# Patient Record
Sex: FEMALE | Race: WHITE | NOT HISPANIC OR LATINO | Employment: PART TIME | URBAN - METROPOLITAN AREA
[De-identification: names, ages, dates, MRNs, and addresses within clinical notes are randomized per-mention and may not be internally consistent; named-entity substitution may affect disease eponyms.]

---

## 2018-07-10 PROBLEM — F51.04 CHRONIC INSOMNIA: Status: ACTIVE | Noted: 2018-07-10

## 2018-10-22 PROBLEM — D50.8 IRON DEFICIENCY ANEMIA SECONDARY TO INADEQUATE DIETARY IRON INTAKE: Status: ACTIVE | Noted: 2018-10-22

## 2021-05-06 ENCOUNTER — PATIENT MESSAGE (OUTPATIENT)
Dept: RESEARCH | Facility: HOSPITAL | Age: 51
End: 2021-05-06

## 2022-04-06 ENCOUNTER — TELEPHONE (OUTPATIENT)
Dept: SPINE | Facility: CLINIC | Age: 52
End: 2022-04-06
Payer: MEDICAID

## 2022-04-12 ENCOUNTER — TELEPHONE (OUTPATIENT)
Dept: SPINE | Facility: CLINIC | Age: 52
End: 2022-04-12
Payer: MEDICAID

## 2022-04-12 NOTE — TELEPHONE ENCOUNTER
Patient returned call.  No new Medicaid appointments available in [place].  Offered to check [facility] but patient declined.  Suggested that she call the Medicaid assistance number that I had left on her voice mail.

## 2022-04-12 NOTE — TELEPHONE ENCOUNTER
2nd attempt to contact patient.  LVM  No new medicaid appointments available.  Gave her the Medicaid assistance number

## 2022-08-29 ENCOUNTER — OFFICE VISIT (OUTPATIENT)
Dept: NEUROLOGY | Facility: CLINIC | Age: 52
End: 2022-08-29
Payer: MEDICAID

## 2022-08-29 VITALS
BODY MASS INDEX: 28.32 KG/M2 | WEIGHT: 153.88 LBS | RESPIRATION RATE: 17 BRPM | SYSTOLIC BLOOD PRESSURE: 109 MMHG | DIASTOLIC BLOOD PRESSURE: 75 MMHG | HEART RATE: 85 BPM | HEIGHT: 62 IN

## 2022-08-29 DIAGNOSIS — D50.8 IRON DEFICIENCY ANEMIA SECONDARY TO INADEQUATE DIETARY IRON INTAKE: ICD-10-CM

## 2022-08-29 DIAGNOSIS — G43.719 INTRACTABLE CHRONIC MIGRAINE WITHOUT AURA AND WITHOUT STATUS MIGRAINOSUS: Primary | ICD-10-CM

## 2022-08-29 DIAGNOSIS — G43.009 MIGRAINE WITHOUT AURA AND WITHOUT STATUS MIGRAINOSUS, NOT INTRACTABLE: ICD-10-CM

## 2022-08-29 DIAGNOSIS — E53.8 VITAMIN B12 DEFICIENCY: ICD-10-CM

## 2022-08-29 DIAGNOSIS — F51.04 CHRONIC INSOMNIA: ICD-10-CM

## 2022-08-29 DIAGNOSIS — Z98.84 S/P GASTRIC BYPASS: ICD-10-CM

## 2022-08-29 DIAGNOSIS — E55.9 VITAMIN D DEFICIENCY: ICD-10-CM

## 2022-08-29 DIAGNOSIS — R51.9 WORSENING HEADACHES: ICD-10-CM

## 2022-08-29 DIAGNOSIS — F90.2 ATTENTION DEFICIT HYPERACTIVITY DISORDER (ADHD), COMBINED TYPE: ICD-10-CM

## 2022-08-29 PROCEDURE — 99205 PR OFFICE/OUTPT VISIT, NEW, LEVL V, 60-74 MIN: ICD-10-PCS | Mod: S$PBB,,, | Performed by: PSYCHIATRY & NEUROLOGY

## 2022-08-29 PROCEDURE — 99215 OFFICE O/P EST HI 40 MIN: CPT | Mod: PBBFAC,PO | Performed by: PSYCHIATRY & NEUROLOGY

## 2022-08-29 PROCEDURE — 3074F PR MOST RECENT SYSTOLIC BLOOD PRESSURE < 130 MM HG: ICD-10-PCS | Mod: CPTII,,, | Performed by: PSYCHIATRY & NEUROLOGY

## 2022-08-29 PROCEDURE — 99999 PR PBB SHADOW E&M-EST. PATIENT-LVL V: ICD-10-PCS | Mod: PBBFAC,,, | Performed by: PSYCHIATRY & NEUROLOGY

## 2022-08-29 PROCEDURE — 3008F PR BODY MASS INDEX (BMI) DOCUMENTED: ICD-10-PCS | Mod: CPTII,,, | Performed by: PSYCHIATRY & NEUROLOGY

## 2022-08-29 PROCEDURE — 99205 OFFICE O/P NEW HI 60 MIN: CPT | Mod: S$PBB,,, | Performed by: PSYCHIATRY & NEUROLOGY

## 2022-08-29 PROCEDURE — 3008F BODY MASS INDEX DOCD: CPT | Mod: CPTII,,, | Performed by: PSYCHIATRY & NEUROLOGY

## 2022-08-29 PROCEDURE — 1160F PR REVIEW ALL MEDS BY PRESCRIBER/CLIN PHARMACIST DOCUMENTED: ICD-10-PCS | Mod: CPTII,,, | Performed by: PSYCHIATRY & NEUROLOGY

## 2022-08-29 PROCEDURE — 3074F SYST BP LT 130 MM HG: CPT | Mod: CPTII,,, | Performed by: PSYCHIATRY & NEUROLOGY

## 2022-08-29 PROCEDURE — 99999 PR PBB SHADOW E&M-EST. PATIENT-LVL V: CPT | Mod: PBBFAC,,, | Performed by: PSYCHIATRY & NEUROLOGY

## 2022-08-29 PROCEDURE — 1159F MED LIST DOCD IN RCRD: CPT | Mod: CPTII,,, | Performed by: PSYCHIATRY & NEUROLOGY

## 2022-08-29 PROCEDURE — 3078F DIAST BP <80 MM HG: CPT | Mod: CPTII,,, | Performed by: PSYCHIATRY & NEUROLOGY

## 2022-08-29 PROCEDURE — 3078F PR MOST RECENT DIASTOLIC BLOOD PRESSURE < 80 MM HG: ICD-10-PCS | Mod: CPTII,,, | Performed by: PSYCHIATRY & NEUROLOGY

## 2022-08-29 PROCEDURE — 1160F RVW MEDS BY RX/DR IN RCRD: CPT | Mod: CPTII,,, | Performed by: PSYCHIATRY & NEUROLOGY

## 2022-08-29 PROCEDURE — 1159F PR MEDICATION LIST DOCUMENTED IN MEDICAL RECORD: ICD-10-PCS | Mod: CPTII,,, | Performed by: PSYCHIATRY & NEUROLOGY

## 2022-08-29 RX ORDER — SUMATRIPTAN SUCCINATE 100 MG/1
100 TABLET ORAL
Qty: 12 TABLET | Refills: 5 | Status: SHIPPED | OUTPATIENT
Start: 2022-08-29 | End: 2022-10-25 | Stop reason: SDUPTHER

## 2022-08-29 RX ORDER — ONDANSETRON 4 MG/1
4 TABLET, ORALLY DISINTEGRATING ORAL EVERY 6 HOURS PRN
Qty: 15 TABLET | Refills: 6 | Status: SHIPPED | OUTPATIENT
Start: 2022-08-29 | End: 2022-10-25 | Stop reason: SDUPTHER

## 2022-08-29 NOTE — PROGRESS NOTES
[facility]    Chief complaint:headache   Referred by: Suha Sepulveda MD  [address] [facility]  [place] [address]     History of Present Illness:    52Y RH F with ADHD, adjustment disorder with anxiety, depression, TMJ/bruxism, vitamin D deficiency, vitamin b12 deficiency, s/p gastric bypass (Anthony-en-Y),  chronic back pain who is here for initial evaluation of headache.     Headache history  Age at onset and course over time: first migraine after she had her first child in 1996 she was 26 years old. She had gone to neurologist in the past and was told it was stress induced migraine. lifelong headache attacks, She has had headache every day for many years she had been with OTC medications. Then things worsened after she had covid since June 2022 and since then the migraine is now much worse and now having more facial pain and all head pain. She mentions that HA is her first symptom of COVID and has continued. She mentions that she has holocephalic headache now. She mentions that she lives with severe back pain, but this is very disruptive now.   Location: fronrtal/periorbital, but can be holocephacli   Character: throbbing/pounding, tight band, pressure   Intensity:  2-10/10, currently 2/10   Baseline pain level is 3-4/10 and escalations to severe pain 5 or more days a week.   Frequency: 30/30  No headache free times   Duration: constant baseline, escalations >4 hours to days   Aura: none   Associated symptoms: photophobia, phonophobia, osmophobia, kinesiophobia, neck tightness/pain, nausea  Other neurologic symptoms: dizziness, blurry vision, mood changes, difficulty concentarting, neck tightness, nasal congestion, sinus pressure   Precipitating factors: sleep deprivation, weather changes, stress  Alleviating factors: sleep, darkness,  massage, local pressure, medications  Aggravating factors: exposure to light, sound, if has attack already  "coughing/sneezing/bending over  Family history of headache: mother and sister had migraine.   ER/UC visits: 0  Caffeine: "plenty"  Sleep: insomnia- trouble initiating/maintaining sleep , uses trazodone nightly.   Gyn: hysterectomy, menopausal     Medication history:  Acute:  Excedrin - 6 days/week  Tizanidine 4mg - 3-5 days/week   Nurtec   APAP  Ibuprofen     Prevention:  Lexapro -currently on this   Trazodone nightly for insomnia   Sertraline - in the past.     Unable to do antihypertensive ssecondary to low blood pressuer at baseline  Unable to do Depakote due to weight and currently using ozempic for weight loss  Unable to do topiramate secondary to ADHD   Unable to use TCA due to trazodone use ofr sleep and weight.     MIDAS: 105      Neuroimaging and other studies:   Results for orders placed or performed during the hospital encounter of 07/16/21   CT Head Without Contrast    Narrative    EXAMINATION:  CT HEAD WITHOUT CONTRAST    CLINICAL HISTORY:  Altered mental status;    TECHNIQUE:  Low dose axial images were obtained through the head.  Coronal and sagittal reformations were also performed. Contrast was not administered.  Automated exposure control was utilized.   [id number]    COMPARISON:  None.    FINDINGS:  No focal lesions are identified.  There is no evidence of ventricular dilatation, midline shift, or hemorrhage.  No territorial infarct was visualized.  No extra cerebral fluid collection was demonstrated.  The bony calvarium appears intact.  No sinus or mastoid abnormality was noted.      Impression    Negative study.      Electronically signed by: Riaz Brooks MD  Date:    07/16/2021  Time:    15:49         ROS: The fourteen point review of system was performed.       Constitutional:  Denies fevers/cold or heat intolerance, weight loss/gain or fatigue.  Eyes:                        Denies diplopia, ptosis, visual field defects or ocular disease   excepting any listed above.  ENT:   Denies hearing " loss, infection, carcinoma or other diseases excepting any listed above.   Cardiovascular:  Denies stroke, CAD, arrhythmia, CHF or other disease excepting any listed above.  Pulmonary:  Denies dyspnea, COPD, RAD or infection or neoplasm excepting any listed above.  Gastrointestinal: Denies ulcer disease, liver or other disease excepting any listed above.  Skin:   Denies rash, skin cancer, or other cutaneous disorder excepting any listed above.  Neurological:  See HPI  Musculoskeletal: Denies RA, fractures or other joint or skeletal problems excepting any listed above.  Heme/Lymphatic: Denies any blood or lymph system neoplasm or disorder excepting any listed above.  Endocrine:  Denies any thyroid or other disorders, excepting any listed above.  Allergic/Immuno: Denies any autoimmune disease or allergy excepting any listed above.  Psychiatric:  Denies any disorder or treatment excepting any listed above.  Urologic:  Denies any difficulties with the urinary system or sexual function except noted above.    Past Medical History:   Diagnosis Date    Adjustment disorder with anxiety 08/06/2015    Anemia 08/06/2015    Attention deficit disorder with hyperactivity(314.01) 08/06/2015    Bronchitis with flu     Complete tear of right rotator cuff 12/27/2016    Depression     General anesthetics causing adverse effect in therapeutic use     Headache     Hypotension, iatrogenic     TMJ (dislocation of temporomandibular joint)        Past Surgical History:   Procedure Laterality Date    ADENOIDECTOMY      COLONOSCOPY      GASTRIC BYPASS      HYSTERECTOMY      ROTATOR CUFF REPAIR Right     SHOULDER ARTHROSCOPY Right 12/27/2016    RCR w/patch    TONSILLECTOMY           Family History   Problem Relation Age of Onset    Cancer Mother     Diabetes Father     Diabetes Sister     Arthritis Maternal Grandmother        Social history:  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, 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   Smoking, Alcohol, IVDU:  Social History     Tobacco Use    Smoking status: Never    Smokeless tobacco: Never   Substance Use Topics    Alcohol use: Yes     Alcohol/week: 0.0 standard drinks     Comment: ONCE EVERY 2 MONTHS    Drug use: No         Review of patient's allergies indicates:   Allergen Reactions    Demerol [meperidine] Itching    Niacin preparations Other (See Comments)     RED MAN SYNDRONE    Penicillins Swelling     OF THROAT    Sudafed [pseudoephedrine hcl] Palpitations    Wellbutrin [bupropion hcl] Photosensitivity, Nausea Only, Anxiety, Palpitations and Other (See Comments)     Leg numbness, sorrow, unexplained crying         Current Outpatient Medications:     benzonatate (TESSALON PERLES) 100 MG capsule, Take 1 capsule (100 mg total) by mouth 3 (three) times daily as needed for Cough., Disp: 30 capsule, Rfl: 1    CHOLECALCIFEROL, VITAMIN D3, (VITAMIN D3 ORAL), Take 1 tablet by mouth once daily. , Disp: , Rfl:     CYANOCOBALAMIN, VITAMIN B-12, ORAL, Take 1 tablet by mouth once daily. , Disp: , Rfl:     dextroamphetamine-amphetamine (ADDERALL XR) 30 MG 24 hr capsule, Take 1 capsule (30 mg total) by mouth every morning., Disp: 30 capsule, Rfl: 0    [START ON 9/9/2022] dextroamphetamine-amphetamine (ADDERALL XR) 30 MG 24 hr capsule, Take 1 capsule (30 mg total) by mouth every morning., Disp: 30 capsule, Rfl: 0    [START ON 10/9/2022] dextroamphetamine-amphetamine (ADDERALL XR) 30 MG 24 hr capsule, Take 1 capsule (30 mg total) by mouth every morning., Disp: 30 capsule, Rfl: 0    EScitalopram oxalate (LEXAPRO) 20 MG tablet, Take 1 tablet (20 mg total) by mouth once daily., Disp: 90 tablet, Rfl: 1    ferrous sulfate 325 mg (65 mg iron) Tab tablet, Take 325 mg by mouth daily with breakfast., Disp: , Rfl:     fluticasone propionate (FLONASE) 50 mcg/actuation nasal spray, 1 spray (50 mcg total) by Each Nostril route once daily., Disp: 16 g, Rfl: 2    hydrOXYzine HCL (ATARAX) 25 MG tablet, TAKE  1 TABLET(25 MG) BY MOUTH EVERY NIGHT AS NEEDED FOR INSOMNIA, Disp: 90 tablet, Rfl: 1    methylPREDNISolone (MEDROL DOSEPACK) 4 mg tablet, use as directed, Disp: 21 each, Rfl: 0    multivitamin (THERAGRAN) per tablet, Take 1 tablet by mouth once daily., Disp: , Rfl:     OZEMPIC 1 mg/dose (4 mg/3 mL), INJECT 1 MG UNDER THE SKIN ONCE WEEKLY AS DIRECTED, Disp: , Rfl:     promethazine (PHENERGAN) 6.25 mg/5 mL syrup, Take 5 mLs (6.25 mg total) by mouth every 6 (six) hours as needed (cough)., Disp: 118 mL, Rfl: 0    spironolactone (ALDACTONE) 50 MG tablet, Take 50 mg by mouth 2 (two) times daily., Disp: , Rfl:     tiZANidine (ZANAFLEX) 4 MG tablet, Take 1 tablet (4 mg total) by mouth nightly as needed (pain)., Disp: 30 tablet, Rfl: 1    traZODone (DESYREL) 150 MG tablet, TAKE 1 TABLET(150 MG) BY MOUTH EVERY NIGHT AS NEEDED FOR INSOMNIA, Disp: 90 tablet, Rfl: 1    UNABLE TO FIND, medication name: Hormone Replacement, Disp: , Rfl:       PHYSICAL EXAMINATION  Vitals:    08/29/22 1004   BP: 109/75   Pulse: 85   Resp: 17     General: The patient is a well-developed, well-nourished looking of stated age in no acute distress.  Head: Normocephalic, atraumatic  Eyes, ears, nose and throat: normal.  Neck: Supple  Cardiovascular: No carotid bruits.  Regular rate and rhythm.   +pericranial tenderness temporalis , occipitalis tenderness  Full ROM   NEUROLOGIC EXAM:  MENTAL: The patient is awake, alert and oriented times to time, place, location and situation. Intact recent memory, attention, concentration. Language and speech are normal. No aphasia, no dysarthria  CRANIAL NERVES:   CN II: visual fields are intact to confrontation with no defects;  funduscopic examination is within normal limits without evidence of papilledema and signs of venous pulsations.  Pupils are equal, round and reactive to light and accommodation.    III, IV and VI: extraocular movements are intact to all directions of gaze with normal convergence.  V: facial  sensation is intact to light touch in divisions V1 through V3.    VII: Facial muscle strength is intact to eyebrow raising, forceful eyelid closure, and cheek puffing.    VIII: Hearing acuity is intact to finger rub.  IX, X: palate rises symmetrically and uvula midline.    XI: Sternocleidomastoid and trapezius strength are 5/5 bilaterally.    XII: Tongue protrudes midline without atrophy or fasciculations.   MOTOR EXAM: No atrophy or fasciculations are present. No pronator drift,  shows normal strength ( 5/5 strength )in all 4 extremities. Tone is normal. SENSORY EXAM: intact pinprick, light touch, vibration, and position bilaterally.  CEREBELLAR EXAM: shows normal finger-to-nose and heel-to-shin.   DEEP TENDON REFLEXES:  +2 in brachioradialis, biceps, triceps, knee jerks, ankle jerks, downgoing toes b/l. No abnormal reflexes are present.  GAIT/STANCE: Romberg Negative.  Standard gait was normal with normal stride, arm swing and turning.  Normal heel and toe walking and tandem gait.     Impression:  Chronic migraine without aura - lifelong migraine headache, family history of headache, she has had years of daily migraine headaches, that she has been managing with OTC meds almost daily, but since COVID 19 infection the intensity of her attacks has worsened and the disability associated with attacks as increased. She is overusing excedrin 6 days a week or so. She has had a gastric bypass and reports having had history of gastric ulcer in the past.  Discussed chronic migraine, post covid headache, discussed MOH at length. She has co morbidities of TMJ/bruxism, anxiety/depression, insomnia. Will apply for Botox for chronic migraine prevention. She has had multiple antidepressants, has low BP at baseline thus unable to do antihypertensives and also is on Ozempic for weight loss thus will avoid TCA, depakote. She has ADHD will avoid Topiramate given her anxiety and ADHD. Will trial triptans for acute management.      Headache due to viral infection - significantly worse after COVID 19 infection since 6/22 increase in intensity and also severity and frequency of severe attacks since covid infection.     Medication overuse headache - from Excedrin       Comorbidities:  Vitamin b12 deficiency  - in the last year B12 went from 559 to 337 1 month ago, she has gastric bypass, has been having fatigue - needs replacement of Vitamin b12 if doing orally not effective recommend IM supplementation goal is above 500     S/p gastric bypass     Vitamin D deficiency     Iron deficiency anemia     ADHD- on medication    Anxiety/depression - on management     Chronic insomnia- uses trazodone PRN     Plan:   1- For preventive management: a. The patient has chronic migraines (G43.719). Patient suffers from headaches more than 15 days a month lasting more than 4 hours a day with no relief of symptoms despite trying multiple medications including but not limited to anti- epileptics,  antihypertensives,  and antidepressants. Botox treatment was approved for chronic migraines in October 2010. The patient will be an ideal candidate for Botox. We are planning for 3 treatments 3 months apart and aiming for atleast 50% improvement in the symptoms. If we see no improvement after 3 treatments, we will discontinue the injections.    Muscles to be injected:  total of 155 units of botulinum toxin type A were  injected in the following muscles: Procerus 5 units,  5 units bilaterally, frontalis 20 units, temporalis 20 units bilaterally, occipitalis 15 units, upper cervical paraspinals 10 units bilaterally and trapezius 15 units bilaterally. Total of 155 units in 31 sites. Unavoidable waste of 45 units will be discarded.         2- Acute management: Sumatriptan 100 mg at onset of attack, can repeat after 2 hours. Max 2/day. No more than 2-3 days a week or 10 days a month.     3- MRI brain w/wo contrast     4- Keep headache diary       RTC in 3 weeks  for Botox.         Rosalinda Tucker MD   Board Certified Neurologist  [facility] Certified Headache Medicine     I spent 60 minutes on day of this encounter preparing, treating, and managing this patient with chronic migraine, headache due to viral infection, adhd, anxiety/depression, chronic insomnia, vitamin b12 defiency , vitamin d deficiency , s/p gastric bypass.

## 2022-09-14 ENCOUNTER — HOSPITAL ENCOUNTER (OUTPATIENT)
Dept: RADIOLOGY | Facility: HOSPITAL | Age: 52
Discharge: HOME | End: 2022-09-14
Attending: PSYCHIATRY & NEUROLOGY
Payer: COMMERCIAL

## 2022-09-14 DIAGNOSIS — R51.9 WORSENING HEADACHES: ICD-10-CM

## 2022-09-14 PROCEDURE — 25500020 PHARM REV CODE 255: Mod: PO | Performed by: PSYCHIATRY & NEUROLOGY

## 2022-09-14 PROCEDURE — 70553 MRI BRAIN STEM W/O & W/DYE: CPT | Mod: TC,PO

## 2022-09-14 PROCEDURE — 70553 MRI BRAIN STEM W/O & W/DYE: CPT | Mod: 26,,, | Performed by: RADIOLOGY

## 2022-09-14 PROCEDURE — 70553 MRI BRAIN W WO CONTRAST: ICD-10-PCS | Mod: 26,,, | Performed by: RADIOLOGY

## 2022-09-14 PROCEDURE — A9585 GADOBUTROL INJECTION: HCPCS | Mod: PO | Performed by: PSYCHIATRY & NEUROLOGY

## 2022-09-14 RX ORDER — GADOBUTROL 604.72 MG/ML
6 INJECTION INTRAVENOUS
Status: COMPLETED | OUTPATIENT
Start: 2022-09-14 | End: 2022-09-14

## 2022-09-14 RX ADMIN — GADOBUTROL 6 ML: 604.72 INJECTION INTRAVENOUS at 09:09

## 2022-09-19 ENCOUNTER — PROCEDURE VISIT (OUTPATIENT)
Dept: NEUROLOGY | Facility: CLINIC | Age: 52
End: 2022-09-19
Payer: MEDICAID

## 2022-09-19 VITALS
DIASTOLIC BLOOD PRESSURE: 76 MMHG | HEART RATE: 93 BPM | WEIGHT: 153 LBS | RESPIRATION RATE: 17 BRPM | BODY MASS INDEX: 28.16 KG/M2 | SYSTOLIC BLOOD PRESSURE: 119 MMHG | HEIGHT: 62 IN

## 2022-09-19 DIAGNOSIS — G43.719 INTRACTABLE CHRONIC MIGRAINE WITHOUT AURA AND WITHOUT STATUS MIGRAINOSUS: Primary | ICD-10-CM

## 2022-09-19 PROCEDURE — 64615 PR CHEMODENERVATION OF MUSCLE FOR CHRONIC MIGRAINE: ICD-10-PCS | Mod: S$PBB,,, | Performed by: PSYCHIATRY & NEUROLOGY

## 2022-09-19 PROCEDURE — 64615 CHEMODENERV MUSC MIGRAINE: CPT | Mod: S$PBB,,, | Performed by: PSYCHIATRY & NEUROLOGY

## 2022-09-19 PROCEDURE — 64615 CHEMODENERV MUSC MIGRAINE: CPT | Mod: PBBFAC,PO | Performed by: PSYCHIATRY & NEUROLOGY

## 2022-09-19 RX ADMIN — ONABOTULINUMTOXINA 200 UNITS: 100 INJECTION, POWDER, LYOPHILIZED, FOR SOLUTION INTRADERMAL; INTRAMUSCULAR at 09:09

## 2022-09-19 NOTE — PROCEDURES
Procedures    BOTOX PROCEDURE    PROCEDURE PERFORMED: Botulinum toxin injection (80338)    CLINICAL INDICATION: G43.719    Cycle #1    A time out was conducted just before the start of the procedure to verify the correct patient and procedure, procedure location, and all relevant critical information.   Signed consent obtained prior to procedure     Conventional methods of treatment but the patient has been unresponsive and refractory.The patient meets criteria for chronic headaches according to the ICHD-III, the patient has more than 15 headaches a month at least 8 of them meet migraine criteria, which last for more than 4 hours a day.     This is the first Botox injections and I am aiming for at least 50%  improvement in the patient's symptoms. Frequency of treatment is every 3 months unless no response to the treatments, at which time we will discontinue the injections.     DESCRIPTION OF PROCEDURE: After obtaining informed consent and under   aseptic technique, a total of 155 units of botulinum toxin type A were   injected in the following muscles: Procerus 5 units,  5 units   bilaterally, frontalis 20 units, temporalis 20 units bilaterally,   occipitalis 15 units, upper cervical paraspinals 10 units bilaterally and trapezius 15 units bilaterally. The patient was given a total of 155 units in 31 sites.      The patient tolerated the procedure well. There were no complications. The patient was given a prescription for repeat treatment in 3 months.     Unavoidable waste 45 units    Rosalinda Tucker MD   Board Certified Neurologist   [facility] Certified Headache Medicine

## 2022-10-25 ENCOUNTER — OFFICE VISIT (OUTPATIENT)
Dept: NEUROLOGY | Facility: CLINIC | Age: 52
End: 2022-10-25
Payer: MEDICAID

## 2022-10-25 DIAGNOSIS — G43.719 INTRACTABLE CHRONIC MIGRAINE WITHOUT AURA AND WITHOUT STATUS MIGRAINOSUS: Primary | ICD-10-CM

## 2022-10-25 PROCEDURE — 1160F RVW MEDS BY RX/DR IN RCRD: CPT | Mod: CPTII,95,, | Performed by: PSYCHIATRY & NEUROLOGY

## 2022-10-25 PROCEDURE — 99213 OFFICE O/P EST LOW 20 MIN: CPT | Mod: 95,,, | Performed by: PSYCHIATRY & NEUROLOGY

## 2022-10-25 PROCEDURE — 1159F PR MEDICATION LIST DOCUMENTED IN MEDICAL RECORD: ICD-10-PCS | Mod: CPTII,95,, | Performed by: PSYCHIATRY & NEUROLOGY

## 2022-10-25 PROCEDURE — 99213 PR OFFICE/OUTPT VISIT, EST, LEVL III, 20-29 MIN: ICD-10-PCS | Mod: 95,,, | Performed by: PSYCHIATRY & NEUROLOGY

## 2022-10-25 PROCEDURE — 1160F PR REVIEW ALL MEDS BY PRESCRIBER/CLIN PHARMACIST DOCUMENTED: ICD-10-PCS | Mod: CPTII,95,, | Performed by: PSYCHIATRY & NEUROLOGY

## 2022-10-25 PROCEDURE — 1159F MED LIST DOCD IN RCRD: CPT | Mod: CPTII,95,, | Performed by: PSYCHIATRY & NEUROLOGY

## 2022-10-25 RX ORDER — SUMATRIPTAN SUCCINATE 100 MG/1
100 TABLET ORAL
Qty: 12 TABLET | Refills: 5 | Status: SHIPPED | OUTPATIENT
Start: 2022-10-25 | End: 2023-05-16 | Stop reason: ALTCHOICE

## 2022-10-25 RX ORDER — ONDANSETRON 4 MG/1
4 TABLET, ORALLY DISINTEGRATING ORAL EVERY 6 HOURS PRN
Qty: 15 TABLET | Refills: 6 | Status: SHIPPED | OUTPATIENT
Start: 2022-10-25 | End: 2024-01-22 | Stop reason: SDUPTHER

## 2022-10-25 NOTE — PROGRESS NOTES
[facility]- Headache [facility]  The patient location is: [place]   The chief complaint leading to consultation is: chronic migraine     Visit type: audiovisual    Face to Face time with patient: 10 minutes  25 minutes of total time spent on the encounter, which includes face to face time and non-face to face time preparing to see the patient (eg, review of tests), Obtaining and/or reviewing separately obtained history, Documenting clinical information in the electronic or other health record, Independently interpreting results (not separately reported) and communicating results to the patient/family/caregiver, or Care coordination (not separately reported).         Each patient to whom he or she provides medical services by telemedicine is:  (1) informed of the relationship between the physician and patient and the respective role of any other health care provider with respect to management of the patient; and (2) notified that he or she may decline to receive medical services by telemedicine and may withdraw from such care at any time.    Notes:     Chief complaint: chronic migraine     History of Present Illness:    52Y RH F with ADHD, adjustment disorder with anxiety, depression, TMJ/bruxism, vitamin D deficiency, vitamin b12 deficiency, s/p gastric bypass (Anthony-en-Y),  chronic back pain who is here for further evaluation of chronic migraine. She was initially seen on 8/22/22 at which time had constant baseline moderate pain level with severe escalations 5 days or more and no headache free times. She was started on Botox for chronic migraine which was started on 9/19/22. She mentions that she feels that her migraine have been less frequent and not as intense and severe as before. She noted improvement a few weeks later she noted improvement in her migraine. She mentions that her baseline pain has dropped and the number severe escalations has improved. She has now only 1 severe escalation if that  "now. She has used sumatriptan 100mg a few times, but she has found that zofran PRN helped the nausea. She mentions that when it gets the point that she has to take the sumatriptan she shuts down and goes to sleep and when wakes up from rest she will feel better.     Headache history  Age at onset and course over time: first migraine after she had her first child in 1996 she was 26 years old. She had gone to neurologist in the past and was told it was stress induced migraine. lifelong headache attacks, She has had headache every day for many years she had been with OTC medications. Then things worsened after she had covid since June 2022 and since then the migraine is now much worse and now having more facial pain and all head pain. She mentions that HA is her first symptom of COVID and has continued. She mentions that she has holocephalic headache now. She mentions that she lives with severe back pain, but this is very disruptive now.   Location: fronrtal/periorbital, but can be holocephacli   Character: throbbing/pounding, tight band, pressure   Intensity:  2-10/10, currently 2/10   Baseline pain level is 3-4/10 and escalations to severe pain 5 or more days a week.   Frequency: 30/30  No headache free times   Duration: constant baseline, escalations >4 hours to days   Aura: none   Associated symptoms: photophobia, phonophobia, osmophobia, kinesiophobia, neck tightness/pain, nausea  Other neurologic symptoms: dizziness, blurry vision, mood changes, difficulty concentarting, neck tightness, nasal congestion, sinus pressure   Precipitating factors: sleep deprivation, weather changes, stress  Alleviating factors: sleep, darkness,  massage, local pressure, medications  Aggravating factors: exposure to light, sound, if has attack already coughing/sneezing/bending over  Family history of headache: mother and sister had migraine.   ER/UC visits: 0  Caffeine: "plenty"  Sleep: insomnia- trouble initiating/maintaining sleep , " uses trazodone nightly.   Gyn: hysterectomy, menopausal     Medication history:  Acute:  Excedrin - 6 days/week  Tizanidine 4mg - 3-5 days/week   Nurtec   APAP  Ibuprofen     Prevention:  Lexapro -currently on this   Trazodone nightly for insomnia   Sertraline - in the past.     Unable to do antihypertensive ssecondary to low blood pressuer at baseline  Unable to do Depakote due to weight and currently using ozempic for weight loss  Unable to do topiramate secondary to ADHD   Unable to use TCA due to trazodone use ofr sleep and weight.     MIDAS: 105      Neuroimaging and other studies:   Results for orders placed or performed during the hospital encounter of 07/16/21   CT Head Without Contrast    Narrative    EXAMINATION:  CT HEAD WITHOUT CONTRAST    CLINICAL HISTORY:  Altered mental status;    TECHNIQUE:  Low dose axial images were obtained through the head.  Coronal and sagittal reformations were also performed. Contrast was not administered.  Automated exposure control was utilized.   [id number]    COMPARISON:  None.    FINDINGS:  No focal lesions are identified.  There is no evidence of ventricular dilatation, midline shift, or hemorrhage.  No territorial infarct was visualized.  No extra cerebral fluid collection was demonstrated.  The bony calvarium appears intact.  No sinus or mastoid abnormality was noted.      Impression    Negative study.      Electronically signed by: Riaz Brooks MD  Date:    07/16/2021  Time:    15:49         ROS: The fourteen point review of system was performed.       Constitutional:  Denies fevers/cold or heat intolerance, weight loss/gain or fatigue.  Eyes:                        Denies diplopia, ptosis, visual field defects or ocular disease   excepting any listed above.  ENT:   Denies hearing loss, infection, carcinoma or other diseases excepting any listed above.   Cardiovascular:  Denies stroke, CAD, arrhythmia, CHF or other disease excepting any listed  above.  Pulmonary:  Denies dyspnea, COPD, RAD or infection or neoplasm excepting any listed above.  Gastrointestinal: Denies ulcer disease, liver or other disease excepting any listed above.  Skin:   Denies rash, skin cancer, or other cutaneous disorder excepting any listed above.  Neurological:  See HPI  Musculoskeletal: Denies RA, fractures or other joint or skeletal problems excepting any listed above.  Heme/Lymphatic: Denies any blood or lymph system neoplasm or disorder excepting any listed above.  Endocrine:  Denies any thyroid or other disorders, excepting any listed above.  Allergic/Immuno: Denies any autoimmune disease or allergy excepting any listed above.  Psychiatric:  Denies any disorder or treatment excepting any listed above.  Urologic:  Denies any difficulties with the urinary system or sexual function except noted above.    Past Medical History:   Diagnosis Date    Adjustment disorder with anxiety 08/06/2015    Anemia 08/06/2015    Attention deficit disorder with hyperactivity(314.01) 08/06/2015    Bronchitis with flu     Complete tear of right rotator cuff 12/27/2016    Depression     General anesthetics causing adverse effect in therapeutic use     Headache     Hypotension, iatrogenic     TMJ (dislocation of temporomandibular joint)        Past Surgical History:   Procedure Laterality Date    ADENOIDECTOMY      COLONOSCOPY      GASTRIC BYPASS      HYSTERECTOMY      ROTATOR CUFF REPAIR Right     SHOULDER ARTHROSCOPY Right 12/27/2016    RCR w/patch    TONSILLECTOMY           Family History   Problem Relation Age of Onset    Cancer Mother     Diabetes Father     Diabetes Sister     Arthritis Maternal Grandmother        Social history:  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, 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   Smoking, Alcohol, IVDU:  Social History     Tobacco Use    Smoking status: Never    Smokeless tobacco: Never   Substance Use Topics    Alcohol use: Yes      Alcohol/week: 0.0 standard drinks     Comment: ONCE EVERY 2 MONTHS    Drug use: No         Review of patient's allergies indicates:   Allergen Reactions    Demerol [meperidine] Itching    Niacin preparations Other (See Comments)     RED MAN SYNDRONE    Penicillins Swelling     OF THROAT    Sudafed [pseudoephedrine hcl] Palpitations    Wellbutrin [bupropion hcl] Photosensitivity, Nausea Only, Anxiety, Palpitations and Other (See Comments)     Leg numbness, sorrow, unexplained crying         Current Outpatient Medications:     benzonatate (TESSALON PERLES) 100 MG capsule, Take 1 capsule (100 mg total) by mouth 3 (three) times daily as needed for Cough., Disp: 30 capsule, Rfl: 1    CHOLECALCIFEROL, VITAMIN D3, (VITAMIN D3 ORAL), Take 1 tablet by mouth once daily. , Disp: , Rfl:     CYANOCOBALAMIN, VITAMIN B-12, ORAL, Take 1 tablet by mouth once daily. , Disp: , Rfl:     dextroamphetamine-amphetamine (ADDERALL XR) 30 MG 24 hr capsule, Take 1 capsule (30 mg total) by mouth every morning., Disp: 30 capsule, Rfl: 0    dextroamphetamine-amphetamine (ADDERALL XR) 30 MG 24 hr capsule, Take 1 capsule (30 mg total) by mouth every morning., Disp: 30 capsule, Rfl: 0    dextroamphetamine-amphetamine (ADDERALL XR) 30 MG 24 hr capsule, Take 1 capsule (30 mg total) by mouth every morning., Disp: 30 capsule, Rfl: 0    diclofenac sodium (VOLTAREN) 1 % Gel, Apply 2 g topically 4 (four) times daily., Disp: 150 g, Rfl: 2    EScitalopram oxalate (LEXAPRO) 20 MG tablet, Take 1 tablet (20 mg total) by mouth once daily., Disp: 90 tablet, Rfl: 1    ferrous sulfate 325 mg (65 mg iron) Tab tablet, Take 325 mg by mouth daily with breakfast., Disp: , Rfl:     fluticasone propionate (FLONASE) 50 mcg/actuation nasal spray, 1 spray (50 mcg total) by Each Nostril route once daily., Disp: 16 g, Rfl: 2    hydrOXYzine HCL (ATARAX) 25 MG tablet, TAKE 1 TABLET(25 MG) BY MOUTH EVERY NIGHT AS NEEDED FOR INSOMNIA, Disp: 90 tablet, Rfl: 1    multivitamin  (THERAGRAN) per tablet, Take 1 tablet by mouth once daily., Disp: , Rfl:     ondansetron (ZOFRAN-ODT) 4 MG TbDL, Take 1 tablet (4 mg total) by mouth every 6 (six) hours as needed (nausea)., Disp: 15 tablet, Rfl: 6    OZEMPIC 1 mg/dose (4 mg/3 mL), INJECT 1 MG UNDER THE SKIN ONCE WEEKLY AS DIRECTED, Disp: , Rfl:     promethazine (PHENERGAN) 6.25 mg/5 mL syrup, Take 5 mLs (6.25 mg total) by mouth every 6 (six) hours as needed (cough)., Disp: 118 mL, Rfl: 0    spironolactone (ALDACTONE) 50 MG tablet, Take 50 mg by mouth 2 (two) times daily., Disp: , Rfl:     sumatriptan (IMITREX) 100 MG tablet, Take 1 tablet (100 mg total) by mouth as needed for Migraine (can repeat after 2 hours. max is 2/day.)., Disp: 12 tablet, Rfl: 5    tiZANidine (ZANAFLEX) 4 MG tablet, Take 1 tablet (4 mg total) by mouth nightly as needed (pain)., Disp: 30 tablet, Rfl: 1    traZODone (DESYREL) 150 MG tablet, TAKE 1 TABLET(150 MG) BY MOUTH EVERY NIGHT AS NEEDED FOR INSOMNIA, Disp: 90 tablet, Rfl: 1    UNABLE TO FIND, medication name: Hormone Replacement, Disp: , Rfl:       PHYSICAL EXAMINATION  There were no vitals filed for this visit.    General: The patient is a well-developed, well-nourished looking of stated age in no acute distress.  Head: Normocephalic, atraumatic  Eyes, ears, nose and throat: normal.  Neck: Supple  Cardiovascular: No carotid bruits.  Regular rate and rhythm.   +pericranial tenderness temporalis , occipitalis tenderness  Full ROM   NEUROLOGIC EXAM:  MENTAL: The patient is awake, alert and oriented times to time, place, location and situation. Intact recent memory, attention, concentration. Language and speech are normal. No aphasia, no dysarthria  CRANIAL NERVES:   CN II: visual fields are intact to confrontation with no defects;  funduscopic examination is within normal limits without evidence of papilledema and signs of venous pulsations.  Pupils are equal, round and reactive to light and accommodation.    III, IV and VI:  extraocular movements are intact to all directions of gaze with normal convergence.  V: facial sensation is intact to light touch in divisions V1 through V3.    VII: Facial muscle strength is intact to eyebrow raising, forceful eyelid closure, and cheek puffing.    VIII: Hearing acuity is intact to finger rub.  IX, X: palate rises symmetrically and uvula midline.    XI: Sternocleidomastoid and trapezius strength are 5/5 bilaterally.    XII: Tongue protrudes midline without atrophy or fasciculations.   MOTOR EXAM: No atrophy or fasciculations are present. No pronator drift,  shows normal strength ( 5/5 strength )in all 4 extremities. Tone is normal. SENSORY EXAM: intact pinprick, light touch, vibration, and position bilaterally.  CEREBELLAR EXAM: shows normal finger-to-nose and heel-to-shin.   DEEP TENDON REFLEXES:  +2 in brachioradialis, biceps, triceps, knee jerks, ankle jerks, downgoing toes b/l. No abnormal reflexes are present.  GAIT/STANCE: Romberg Negative.  Standard gait was normal with normal stride, arm swing and turning.  Normal heel and toe walking and tandem gait.     Impression:  Chronic migraine without aura - lifelong migraine headache, family history of headache, she has had years of daily migraine headaches, that she has been managing with OTC meds almost daily, but since COVID 19 infection the intensity of her attacks has worsened and the disability associated with attacks as increased. She had been initially overusing excedrin 6 days a week or so. She has had a gastric bypass and reports having had history of gastric ulcer in the past.  She has been started on Botox for chronic migraine management and after first cycle has noted improvement with decrease in the baseline pain and also the number of severe escalations, severe escalation at most 1 per week and baseline pain is mild and functional. We willl continue current regimen.     Headache due to viral infection - significantly worse after COVID  19 infection since 6/22 increase in intensity and also severity and frequency of severe attacks since covid infection.     Comorbidities:  Vitamin b12 deficiency  - in the last year B12 went from 559 to 337 1 month ago, she has gastric bypass, has been having fatigue - IM supplementation per PCP.     S/p gastric bypass     Vitamin D deficiency     Iron deficiency anemia     ADHD- on medication    Anxiety/depression - on management     Chronic insomnia- uses trazodone PRN     Plan:   1- For preventive management: a. Continue Botox every 12 weeks.          2- Acute management: Sumatriptan 100 mg at onset of attack, can repeat after 2 hours. Max 2/day. No more than 2-3 days a week or 10 days a month.   If nauseated add Zofran ODT 4mg ever 6-8 hours as needed.     3- Keep track of headache     RTC in 12/22 with NP CAYLA Page . Care will be transferred to my colleagues in headache clinic upon my departure from [facility]. Patient expressed understanding.           Rosalinda Tucker MD   Board Certified Neurologist  [facility] Certified Headache Medicine

## 2022-12-13 ENCOUNTER — PROCEDURE VISIT (OUTPATIENT)
Dept: NEUROLOGY | Facility: CLINIC | Age: 52
End: 2022-12-13
Payer: MEDICAID

## 2022-12-13 VITALS
WEIGHT: 138 LBS | SYSTOLIC BLOOD PRESSURE: 107 MMHG | DIASTOLIC BLOOD PRESSURE: 70 MMHG | HEIGHT: 62 IN | RESPIRATION RATE: 17 BRPM | HEART RATE: 83 BPM | BODY MASS INDEX: 25.4 KG/M2

## 2022-12-13 DIAGNOSIS — G43.719 INTRACTABLE CHRONIC MIGRAINE WITHOUT AURA AND WITHOUT STATUS MIGRAINOSUS: Primary | ICD-10-CM

## 2022-12-13 PROCEDURE — 64615 CHEMODENERV MUSC MIGRAINE: CPT | Mod: S$PBB,,, | Performed by: NURSE PRACTITIONER

## 2022-12-13 PROCEDURE — 64615 PR CHEMODENERVATION OF MUSCLE FOR CHRONIC MIGRAINE: ICD-10-PCS | Mod: S$PBB,,, | Performed by: NURSE PRACTITIONER

## 2022-12-13 PROCEDURE — 64615 CHEMODENERV MUSC MIGRAINE: CPT | Mod: PBBFAC,PO | Performed by: NURSE PRACTITIONER

## 2022-12-13 RX ADMIN — ONABOTULINUMTOXINA 200 UNITS: 100 INJECTION, POWDER, LYOPHILIZED, FOR SOLUTION INTRADERMAL; INTRAMUSCULAR at 09:12

## 2022-12-13 NOTE — PROCEDURES
Procedures    A time out was conducted just before the start of the procedure to verify the correct patient and procedure, procedure location, and all relevant critical information.      Conventional methods of treatment such as multiple medications, both on and   off label have been tried including: Lexapro, trazodone, sertraline, Unable to do antihypertensive ssecondary to low blood pressuer at baseline. Unable to do Depakote due to weight and currently using ozempic for weight loss  Unable to do topiramate secondary to ADHD   Unable to use TCA due to trazodone use ofr sleep and weight.      The patient has been unresponsive and refractory.The patient meets criteria for chronic headaches according to the ICHD-II, the patient has more than 15 headaches a month which last for more than 4 hours a day.     Botox session number: 2  Last session was 12 weeks ago and resulted in improvement of: n/a     I am aiming for at least 50%  improvement in the patient's symptoms. Frequency of treatment is every 3 months unless no response to the treatments, at which time we will discontinue the injections.      DESCRIPTION OF PROCEDURE: After obtaining informed consent and under   aseptic technique, a total of 155 units of botulinum toxin type A were   injected in the following muscles:      -- Procerus 5 units  --  5 units bilaterally  -- Frontalis 20 units  -- Temporalis 20 units bilaterally  -- Occipitalis 15 units bilaterally  -- Upper cervical paraspinals 10 units bilaterally  -- Trapezius 15 units bilaterally.      The patient tolerated the procedure well. There were no complications. The patient was given a prescription for repeat treatment in 12 weeks      Unavoidable waste 45 units    GUANAKITO Barfield

## 2023-02-14 ENCOUNTER — HOSPITAL ENCOUNTER (OUTPATIENT)
Dept: RADIOLOGY | Facility: HOSPITAL | Age: 53
Discharge: HOME | End: 2023-02-14
Attending: PHYSICIAN ASSISTANT
Payer: MEDICAID

## 2023-02-14 ENCOUNTER — OFFICE VISIT (OUTPATIENT)
Dept: PAIN MEDICINE | Facility: CLINIC | Age: 53
End: 2023-02-14
Payer: MEDICAID

## 2023-02-14 VITALS
HEIGHT: 62 IN | DIASTOLIC BLOOD PRESSURE: 63 MMHG | BODY MASS INDEX: 25.84 KG/M2 | WEIGHT: 140.44 LBS | SYSTOLIC BLOOD PRESSURE: 97 MMHG | HEART RATE: 87 BPM

## 2023-02-14 DIAGNOSIS — M54.2 NECK PAIN: ICD-10-CM

## 2023-02-14 DIAGNOSIS — G89.29 CHRONIC BILATERAL LOW BACK PAIN WITH RIGHT-SIDED SCIATICA: Primary | ICD-10-CM

## 2023-02-14 DIAGNOSIS — G89.29 CHRONIC BILATERAL LOW BACK PAIN WITH RIGHT-SIDED SCIATICA: ICD-10-CM

## 2023-02-14 DIAGNOSIS — M54.41 CHRONIC BILATERAL LOW BACK PAIN WITH RIGHT-SIDED SCIATICA: ICD-10-CM

## 2023-02-14 DIAGNOSIS — M54.41 CHRONIC BILATERAL LOW BACK PAIN WITH RIGHT-SIDED SCIATICA: Primary | ICD-10-CM

## 2023-02-14 PROCEDURE — 99203 PR OFFICE/OUTPT VISIT, NEW, LEVL III, 30-44 MIN: ICD-10-PCS | Mod: S$PBB,,, | Performed by: PHYSICIAN ASSISTANT

## 2023-02-14 PROCEDURE — 99999 PR PBB SHADOW E&M-EST. PATIENT-LVL V: ICD-10-PCS | Mod: PBBFAC,,, | Performed by: PHYSICIAN ASSISTANT

## 2023-02-14 PROCEDURE — 1159F PR MEDICATION LIST DOCUMENTED IN MEDICAL RECORD: ICD-10-PCS | Mod: CPTII,,, | Performed by: PHYSICIAN ASSISTANT

## 2023-02-14 PROCEDURE — 1160F RVW MEDS BY RX/DR IN RCRD: CPT | Mod: CPTII,,, | Performed by: PHYSICIAN ASSISTANT

## 2023-02-14 PROCEDURE — 3078F PR MOST RECENT DIASTOLIC BLOOD PRESSURE < 80 MM HG: ICD-10-PCS | Mod: CPTII,,, | Performed by: PHYSICIAN ASSISTANT

## 2023-02-14 PROCEDURE — 3074F PR MOST RECENT SYSTOLIC BLOOD PRESSURE < 130 MM HG: ICD-10-PCS | Mod: CPTII,,, | Performed by: PHYSICIAN ASSISTANT

## 2023-02-14 PROCEDURE — 3078F DIAST BP <80 MM HG: CPT | Mod: CPTII,,, | Performed by: PHYSICIAN ASSISTANT

## 2023-02-14 PROCEDURE — 72114 X-RAY EXAM L-S SPINE BENDING: CPT | Mod: TC,PO

## 2023-02-14 PROCEDURE — 72114 XR LUMBAR SPINE 5 VIEW WITH FLEX AND EXT: ICD-10-PCS | Mod: 26,,, | Performed by: RADIOLOGY

## 2023-02-14 PROCEDURE — 3074F SYST BP LT 130 MM HG: CPT | Mod: CPTII,,, | Performed by: PHYSICIAN ASSISTANT

## 2023-02-14 PROCEDURE — 1160F PR REVIEW ALL MEDS BY PRESCRIBER/CLIN PHARMACIST DOCUMENTED: ICD-10-PCS | Mod: CPTII,,, | Performed by: PHYSICIAN ASSISTANT

## 2023-02-14 PROCEDURE — 72052 X-RAY EXAM NECK SPINE 6/>VWS: CPT | Mod: TC,PO

## 2023-02-14 PROCEDURE — 3008F PR BODY MASS INDEX (BMI) DOCUMENTED: ICD-10-PCS | Mod: CPTII,,, | Performed by: PHYSICIAN ASSISTANT

## 2023-02-14 PROCEDURE — 72114 X-RAY EXAM L-S SPINE BENDING: CPT | Mod: 26,,, | Performed by: RADIOLOGY

## 2023-02-14 PROCEDURE — 99999 PR PBB SHADOW E&M-EST. PATIENT-LVL V: CPT | Mod: PBBFAC,,, | Performed by: PHYSICIAN ASSISTANT

## 2023-02-14 PROCEDURE — 72052 XR CERVICAL SPINE 5 VIEW WITH FLEX AND EXT: ICD-10-PCS | Mod: 26,,, | Performed by: RADIOLOGY

## 2023-02-14 PROCEDURE — 1159F MED LIST DOCD IN RCRD: CPT | Mod: CPTII,,, | Performed by: PHYSICIAN ASSISTANT

## 2023-02-14 PROCEDURE — 72052 X-RAY EXAM NECK SPINE 6/>VWS: CPT | Mod: 26,,, | Performed by: RADIOLOGY

## 2023-02-14 PROCEDURE — 3008F BODY MASS INDEX DOCD: CPT | Mod: CPTII,,, | Performed by: PHYSICIAN ASSISTANT

## 2023-02-14 PROCEDURE — 99215 OFFICE O/P EST HI 40 MIN: CPT | Mod: PBBFAC,PN | Performed by: PHYSICIAN ASSISTANT

## 2023-02-14 PROCEDURE — 99203 OFFICE O/P NEW LOW 30 MIN: CPT | Mod: S$PBB,,, | Performed by: PHYSICIAN ASSISTANT

## 2023-02-14 NOTE — PROGRESS NOTES
[facility] New Patient Evaluation      Referred by: Dr. Suha Sepulveda    PCP:   Suha Sepulveda MD    CC:   Chief Complaint   Patient presents with    Low-back Pain     Pain radiates down the right leg to the calf.    Neck Pain      No flowsheet data found.      HPI:   Sola Cross is a 52 y.o. female with history of anxiety, depression, migraine, adjustment disorder, chronic pain presents with neck and lower back pain.  She has chronic intermittent pain in the posterior neck and upper thoracic region.  She has years chronic intermittent lowr back pain.  In 2021 Lower back pain became more constant.  Pain is felt in the lower lumbar region with radiation to the right buttock, lateral thigh, anterior knee, lateral calf.  She is not sure if she has experienced any numbness or tingling in the leg.  Rare left leg pain.  She has tried voltaren gel, naproxen, zanaflex, and pain medication prescribed for a family member.  She has tried chiropractic care.      Past and current medications:  Antineuropathics:  NSAIDs:  (tried voltaren gel and naproxen)  Antidepressants:  Muscle relaxers:  (tried zanaflex)  Opioids: tried medication from a family member  Antiplatelets/Anticoagulants:    Physical therapy/ Chiropractic care:  Chiropractic care intermittently through the years with last visit in 2022.    Pain Intervention History:   none    Past Spine Surgical History:  none      History:    Current Outpatient Medications:     CHOLECALCIFEROL, VITAMIN D3, (VITAMIN D3 ORAL), Take 1 tablet by mouth once daily. , Disp: , Rfl:     CYANOCOBALAMIN, VITAMIN B-12, ORAL, Take 1 tablet by mouth once daily. , Disp: , Rfl:     dextroamphetamine-amphetamine (ADDERALL XR) 30 MG 24 hr capsule, Take 1 capsule (30 mg total) by mouth every morning., Disp: 30 capsule, Rfl: 0    dextroamphetamine-amphetamine (ADDERALL XR) 30 MG 24 hr capsule, Take 1 capsule (30 mg total) by mouth every morning., Disp: 30 capsule, Rfl: 0     dextroamphetamine-amphetamine (ADDERALL XR) 30 MG 24 hr capsule, Take 1 capsule (30 mg total) by mouth every morning., Disp: 30 capsule, Rfl: 0    dextroamphetamine-amphetamine (ADDERALL) 30 mg Tab, One tablet Daily after lunch., Disp: 30 tablet, Rfl: 0    dextroamphetamine-amphetamine (ADDERALL) 30 mg Tab, One table Daily after lunch., Disp: 30 tablet, Rfl: 0    [START ON 2/26/2023] dextroamphetamine-amphetamine (ADDERALL) 30 mg Tab, One table Daily after lunch., Disp: 30 tablet, Rfl: 0    diclofenac sodium (VOLTAREN) 1 % Gel, Apply 2 g topically 4 (four) times daily., Disp: 150 g, Rfl: 2    EScitalopram oxalate (LEXAPRO) 20 MG tablet, Take 1 tablet (20 mg total) by mouth once daily., Disp: 90 tablet, Rfl: 1    ferrous sulfate 325 mg (65 mg iron) Tab tablet, Take 325 mg by mouth daily with breakfast., Disp: , Rfl:     fluticasone propionate (FLONASE) 50 mcg/actuation nasal spray, 1 spray (50 mcg total) by Each Nostril route once daily., Disp: 16 g, Rfl: 2    HYDROcodone-acetaminophen (NORCO) 5-325 mg per tablet, Take 1 tablet by mouth every 6 (six) hours as needed for Pain., Disp: 10 tablet, Rfl: 0    hydrOXYzine HCL (ATARAX) 25 MG tablet, TAKE 1 TABLET(25 MG) BY MOUTH EVERY NIGHT AS NEEDED FOR INSOMNIA, Disp: 90 tablet, Rfl: 1    multivitamin (THERAGRAN) per tablet, Take 1 tablet by mouth once daily., Disp: , Rfl:     naproxen sodium (ANAPROX) 550 MG tablet, Take 1 tablet (550 mg total) by mouth 2 (two) times daily with meals., Disp: 20 tablet, Rfl: 0    ondansetron (ZOFRAN-ODT) 4 MG TbDL, Take 1 tablet (4 mg total) by mouth every 6 (six) hours as needed (nausea)., Disp: 15 tablet, Rfl: 6    OZEMPIC 1 mg/dose (4 mg/3 mL), INJECT 1 MG UNDER THE SKIN ONCE WEEKLY AS DIRECTED, Disp: , Rfl:     spironolactone (ALDACTONE) 50 MG tablet, Take 50 mg by mouth 2 (two) times daily., Disp: , Rfl:     sumatriptan (IMITREX) 100 MG tablet, Take 1 tablet (100 mg total) by mouth as needed for Migraine (can repeat after 2 hours. max  is 2/day.)., Disp: 12 tablet, Rfl: 5    tiZANidine (ZANAFLEX) 4 MG tablet, Take 1 tablet (4 mg total) by mouth nightly as needed (pain)., Disp: 30 tablet, Rfl: 1    traZODone (DESYREL) 150 MG tablet, TAKE 1 TABLET(150 MG) BY MOUTH EVERY NIGHT AS NEEDED FOR INSOMNIA, Disp: 90 tablet, Rfl: 1    UNABLE TO FIND, medication name: Hormone Replacement, Disp: , Rfl:     Current Facility-Administered Medications:     onabotulinumtoxina injection 200 Units, 200 Units, Intramuscular, q12 weeks, Lyric Page NP, 200 Units at 12/13/22 0959    Past Medical History:   Diagnosis Date    Adjustment disorder with anxiety 08/06/2015    Anemia 08/06/2015    Attention deficit disorder with hyperactivity(314.01) 08/06/2015    Bronchitis with flu     Complete tear of right rotator cuff 12/27/2016    Depression     General anesthetics causing adverse effect in therapeutic use     Headache     Hypotension, iatrogenic     TMJ (dislocation of temporomandibular joint)        Past Surgical History:   Procedure Laterality Date    ADENOIDECTOMY      BREAST BIOPSY      COLONOSCOPY      GASTRIC BYPASS      HYSTERECTOMY      ROTATOR CUFF REPAIR Right     SHOULDER ARTHROSCOPY Right 12/27/2016    RCR w/patch    TONSILLECTOMY         Family History   Problem Relation Age of Onset    Cancer Mother     Diabetes Father     Diabetes Sister     Arthritis Maternal Grandmother        Social History     Socioeconomic History    Marital status: Single   Tobacco Use    Smoking status: Never    Smokeless tobacco: Never   Substance and Sexual Activity    Alcohol use: Yes     Alcohol/week: 0.0 standard drinks     Comment: ONCE EVERY 2 MONTHS    Drug use: No    Sexual activity: Yes       Review of patient's allergies indicates:   Allergen Reactions    Demerol [meperidine] Itching    Niacin preparations Other (See Comments)     RED MAN SYNDRONE    Penicillins Swelling     OF THROAT    Sudafed [pseudoephedrine hcl] Palpitations    Wellbutrin [bupropion hcl]  "Photosensitivity, Nausea Only, Anxiety, Palpitations and Other (See Comments)     Leg numbness, sorrow, unexplained crying       Review of Systems:  Neck pain.  Low back pain.  Right leg pain.  Balance of review of systems is negative.    Physical Exam:  Vitals:    02/14/23 1300   BP: 97/63   Pulse: 87   Weight: 63.7 kg (140 lb 6.9 oz)   Height: 5' 2" (1.575 m)   PainSc:   6   PainLoc: Back     Body mass index is 25.69 kg/m².    Gen: NAD  Psych: mood appropriate for given condition  HEENT: eyes anicteric   CV: RRR, 2+ radial pulse  HEENT: anicteric   Respiratory: non-labored, no signs of respiratory distress  Abd: non-distended  Skin: warm, dry and intact.  Gait: Able to heel walk, toe walk. No antalgic gait.     Coordination:   Romberg: negative  Finger to nose coordination: normal  Heel to shin coordination: normal  Tandem walking coordination: normal    Cervical spine:   ROM is full in flexion, extension and lateral rotation without increased pain.  Spurling's maneuver causes no neck pain to either side.  Myofascial exam: No Tenderness to palpation across cervical paraspinous region bilaterally.    Lumbar spine:   ROM is full with flexion extension and oblique extension with no increased pain.    Juliocesar's test causes no increased pain on either side.    Supine straight leg raise is negative bilaterally.    Internal and external rotation of the hip causes no increased pain on either side.  Myofascial exam: No tenderness to palpation across lumbar paraspinous muscles. No tenderness to palpation over the bilateral greater trochanters and bilateral SI joint    Sensory:  Intact and symmetrical to light touch in C4-T1 dermatomes bilaterally. Intact and symmetrical to light touch in L1-S1 dermatomes bilaterally.    Motor:    Right Left   C4 Shoulder Abduction  5  5   C5 Elbow Flexion    5  5   C6 Wrist Extension  5  5   C7 Elbow Extension   5  5   C8/T1 Hand Intrinsics   5  5        Right Left   L2/3 Iliacus Hip " flexion  5  5   L3/4 Qudratus Femoris Knee Extension  5  5   L4/5 Tib Anterior Ankle Dorsiflexion   5  5   L5/S1 Extensor Hallicus Longus Great toe extension  5  5   S1/S2 Gastroc/Soleus Plantar Flexion  5  5      Right Left   Triceps DTR 2+ 2+   Biceps DTR 2+ 2+   Brachioradialis DTR 2+ 2+   Patellar DTR 2+ 2+   Achilles DTR 2+ 2+   Morgan Absent  Absent   Clonus Absent Absent   Babinski Absent Absent     Imaging:    None for the spine.    Labs:  Lab Results   Component Value Date    HGBA1C 5.4 09/29/2020       Lab Results   Component Value Date    WBC 5.09 07/14/2022    HGB 12.0 07/14/2022    HCT 35.7 (L) 07/14/2022    MCV 93 07/14/2022     07/14/2022           Assessment:     Ms. Selby has chronic neck pain, lumbar back pain with right leg radicular pain - possible right L4 radiculoathy.  She has no neurological deficits.  We will obtain xrays of the neck and lower back to rule out any major structural abnormalities contributing to pain.  We will call with resutls.  I recommend PT for the neck and lwoer back.  Follow up in 10 weeks to monitor progress.  Discouraged use of medication prescribed for a family member.      Problem List Items Addressed This Visit    None  Visit Diagnoses       Chronic bilateral low back pain with right-sided sciatica    -  Primary    Relevant Orders    X-Ray Lumbar Complete Including Flex And Ext (Completed)    Ambulatory referral/consult to Physical/Occupational Therapy    Neck pain        Relevant Orders    X-Ray Cervical Spine 5 View W Flex Extxt (Completed)    Ambulatory referral/consult to Physical/Occupational Therapy              Follow Up: RTC 10 weeks.    : Not viewed.          Esmer Pedroza PA-C  [facility]      This note was completed with dictation software and grammatical errors may exist.

## 2023-02-15 ENCOUNTER — PATIENT MESSAGE (OUTPATIENT)
Dept: PAIN MEDICINE | Facility: CLINIC | Age: 53
End: 2023-02-15
Payer: MEDICAID

## 2023-02-15 ENCOUNTER — TELEPHONE (OUTPATIENT)
Dept: PAIN MEDICINE | Facility: CLINIC | Age: 53
End: 2023-02-15
Payer: MEDICAID

## 2023-02-15 NOTE — TELEPHONE ENCOUNTER
Left msg on machine that I was calling about her xray results and advised that I would send a mychart msg and to call back if any questions

## 2023-02-16 ENCOUNTER — CLINICAL SUPPORT (OUTPATIENT)
Dept: REHABILITATION | Facility: HOSPITAL | Age: 53
End: 2023-02-16
Payer: MEDICAID

## 2023-02-16 ENCOUNTER — PATIENT MESSAGE (OUTPATIENT)
Dept: NEUROLOGY | Facility: CLINIC | Age: 53
End: 2023-02-16
Payer: MEDICAID

## 2023-02-16 DIAGNOSIS — M54.2 NECK PAIN: ICD-10-CM

## 2023-02-16 DIAGNOSIS — M54.41 CHRONIC BILATERAL LOW BACK PAIN WITH RIGHT-SIDED SCIATICA: ICD-10-CM

## 2023-02-16 DIAGNOSIS — G89.29 CHRONIC BILATERAL LOW BACK PAIN WITH RIGHT-SIDED SCIATICA: ICD-10-CM

## 2023-02-16 PROCEDURE — 97162 PT EVAL MOD COMPLEX 30 MIN: CPT | Mod: PN

## 2023-02-16 NOTE — PLAN OF CARE
[facility]   Physical Therapy Initial Evaluation     Date: 2/16/2023   Name: Sola Cross  Clinic Number: [id number]    Therapy Diagnosis:   Encounter Diagnoses   Name Primary?    Chronic bilateral low back pain with right-sided sciatica     Neck pain      Physician: Esmer Pedroza PA-C    Physician Orders: PT Eval and Treat   Medical Diagnosis from Referral: M54.41,G89.29 (ICD-10-CM) - Chronic bilateral low back pain with right-sided sciatica M54.2 (ICD-10-CM) - Neck pain   Evaluation Date: 2/16/2023  Authorization Period Expiration: 2/14/2024  Plan of Care Expiration: 3/30/2023  Progress Note Due: 10th visit   Visit # / Visits authorized: 1/ pending auth   FOTO: 1/10     Precautions: Standard     Time In: 8:30am  Time Out: 9:15am  Total Appointment Time (timed & untimed codes): 45 minutes      SUBJECTIVE     Date of onset: Chronic for years     History of current condition - Sola reports: consistent pain now for almost a year and half where the pain is there everyday.  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  She states she has had the pain for years and recently she has had pain more in the evenings when she got in the car to go home with spasms. Reports that there has been days when she can't physically walk because the pain gets so bad.  Works 12-16 hour days 6 days a week. Has an upcoming MD appointment today for the left tibia fracture.  Pt reports she was seeing a chiropractor for her back and neck issues last year.  Pt states she would only go when she could not walk or turn her head.  Pt states she has noticed a decline in function recently due to the pain.  Pt states that she has been non weight for the last 4 weeks and her neck and shoulders are more flared up since she has been using the crutches for this recent injury.      Falls: Had a fall at work and fractured her tibia.  Workman's comp case.  Fall was 4 weeks ago this upcoming Saturday.      Imaging, xrays:   EXAMINATION:  XR CERVICAL SPINE 5 VIEW WITH FLEX AND EXT     CLINICAL HISTORY:  Cervicalgia     TECHNIQUE:  Five views of the cervical spine plus flexion and extension views were performed.     COMPARISON:  Plain films of the cervical spine dated 08/06/2015     FINDINGS:  The cervical vertebral bodies maintain normal height.  There is no fracture.  Alignment is normal.  There is normal flexion and extension.  The odontoid process is intact.  There is mild-to-moderate disc space narrowing at the C6-7 level where there is associated endplate osteophyte formation.  These changes have progressed compared to the prior plain films.  The facet joints maintain normal articulation.  There is only mild bony foraminal narrowing at several levels, most apparent on the right at the C3-4 level.  The prevertebral soft tissues are normal.  The airway is patent.     Impression:     As above        Electronically signed by: Daniel Khoury MD  Date:                                            02/14/2023  Time:                                           14:39    EXAMINATION:  XR LUMBAR SPINE 5 VIEW WITH FLEX AND EXT     CLINICAL HISTORY:  Lumbago with sciatica, right side     TECHNIQUE:  Five views of the lumbar spine plus flexion extension views were performed.     COMPARISON:  None.     FINDINGS:  Multiple surgical clips are present in the left side of the abdomen and pelvis.  Also, there is a suture line in the left upper abdomen.  The lumbar vertebral bodies maintain normal height.  There is no fracture.  There is mild anterolisthesis of L5 on S1 without measurable change on flexion or extension.  There is marked disc space narrowing at this level in addition to facet joint arthropathy.  Milder facet joint arthropathy is present at the L4-5 level.     Impression:     1. There is no fracture.  Mild anterolisthesis of L5 on S1 is unchanged with flexion or extension.  2. There is marked disc space narrowing at  the L5-S1 level where there is facet joint arthropathy.  Milder facet joint arthropathy is present at the L4-5 level.        Electronically signed by: Daniel Khoury MD  Date:                                            02/14/2023  Time:                                           14:37      Prior Therapy: No PT up until this point  Social History:  lives with their partner  Occupation: Works in retail   Prior Level of Function: Able to work through her pain before the injury to her Left tibia   Current Level of Function: Not working and seeking treatment for her Left tibia     Pain:  Current 8/10, worst 10/10, best 5/10   Location: Shoulder Blades  generalized  generalized   Description: Aching, Grabbing, Tight, and Sharp  Aggravating Factors: Use of crutches   Easing Factors: relaxation and rest     Patients goals: Be able to move forward with her daily life     Medical History:   Past Medical History:   Diagnosis Date    Adjustment disorder with anxiety 08/06/2015    Anemia 08/06/2015    Attention deficit disorder with hyperactivity(314.01) 08/06/2015    Bronchitis with flu     Complete tear of right rotator cuff 12/27/2016    Depression     General anesthetics causing adverse effect in therapeutic use     Headache     Hypotension, iatrogenic     TMJ (dislocation of temporomandibular joint)        Surgical History:   Sola Cross  has a past surgical history that includes Tonsillectomy; Adenoidectomy; Gastric bypass; Hysterectomy; Colonoscopy; Rotator cuff repair (Right); Shoulder arthroscopy (Right, 12/27/2016); and Breast biopsy.    Medications:   Sola has a current medication list which includes the following prescription(s): cholecalciferol (vitamin d3), cyanocobalamin (vitamin b-12), dextroamphetamine-amphetamine, dextroamphetamine-amphetamine, dextroamphetamine-amphetamine, dextroamphetamine-amphetamine, dextroamphetamine-amphetamine, [START ON 2/26/2023] dextroamphetamine-amphetamine, diclofenac  sodium, escitalopram oxalate, ferrous sulfate, fluticasone propionate, hydrocodone-acetaminophen, hydroxyzine hcl, multivitamin, naproxen sodium, ondansetron, ozempic, spironolactone, sumatriptan, tizanidine, trazodone, and UNABLE TO FIND, and the following Facility-Administered Medications: onabotulinumtoxina.    Allergies:   Review of patient's allergies indicates:   Allergen Reactions    Demerol [meperidine] Itching    Niacin preparations Other (See Comments)     RED MAN SYNDRONE    Penicillins Swelling     OF THROAT    Sudafed [pseudoephedrine hcl] Palpitations    Wellbutrin [bupropion hcl] Photosensitivity, Nausea Only, Anxiety, Palpitations and Other (See Comments)     Leg numbness, sorrow, unexplained crying          OBJECTIVE   Observation: ambulates to clinic with use of B UE crutches with Don Aleja Hinged knee brace in place locked into extension with non weight bearing.      Posture: Scapular winging B in seated;  Slumped posture with forward head and protracted shoulders  Palpation: TTP in cervical and upper thoracic spine paraspinal musculature and periscapular tissues  Sensation: Intact    Cervical Active range of motion  Pain/dysfunction with movement   Flexion WNL No   Extension WNL No   Right side bending WNL No   Left side bending WNL No   Right rotation WNL No   Left rotation WNL No      Shoulder Right Left Pain/dysfunction with movement   Active range of motion/passive range of motion       Flexion  WNL  WNL    Extension      Abduction  WNL  WNL    Internal rotation at 90 degrees abduction  WNL  WNL    External rotation at 90 degrees abduction   WNL  WNL      Elbow Right Left Pain/dysfunction with movement   Active range of motion/passive range of motion       Flexion  WNL  WNL    Extension        Upper extremity manual muscle tests  Right Left Pain/dysfunction with movement   Shoulder flexion 5/5 5/5    Shoulder extension 5/5 5/5    Shoulder abduction 5/5 5/5    Shoulder internal rotation at 90  degrees abduction 5/5 5/5    Shoulder external rotation at 90 degrees abduction   5/5 5/5    Elbow flexion  5/5 5/5    Elbow extension 5/5 5/5    Upper trapezius 4/5 4/5    Middle trapezius 4/5 4/5    Lower trapezius 4/5 4/5      Upper extremity myotomes Right Left Pain/dysfunction with movement   Shoulder elevation 5/5 5/5    Shoulder abduction 5/5 5/5    Elbow flexion  5/5 5/5    Wrist extension 5/5 5/5    Elbow extension 5/5 5/5    Wrist flexion 5/5 5/5    Thumb extension 5/5 5/5    Finger abduction 5/5 5/5      Joint mobility:   Unable to formally assess due to patient increased pain and oncoming migraine today    Special tests: Unremarkable in either shoulder and neck                    Limitation/Restriction for FOTO Lumbar Spine Survey    Therapist reviewed FOTO scores for Sola Cross on 2/16/2023.   FOTO documents entered into [employer] - see Media section.    Limitation Score: 44%         TREATMENT     Total Treatment time (time-based codes) separate from Evaluation: 00 minutes      Sola received the treatments listed below:          PATIENT EDUCATION AND HOME EXERCISES     Education provided:   - Anatomy, physiology, PT POC.  PT also educated patient on process and assisted with setting up a referral for acupunture appointment.    Written Home Exercises Provided: Deferred to follow up. Exercises were reviewed and Sola was able to demonstrate them prior to the end of the session.  Sola demonstrated fair understanding of the education provided. See EMR under Patient Instructions for exercises provided during therapy sessions.    ASSESSMENT     Sola is a 52 y.o. female referred to outpatient Physical Therapy with a medical diagnosis of M54.41,G89.29 (ICD-10-CM) - Chronic bilateral low back pain with right-sided sciatica M54.2 (ICD-10-CM) - Neck pain . Patient presents with new injury to her left LE that she is following up with ortho today for.  In her intial evaluation today her main c/o was neck and  shoulder pain related to her use of crutches following the injury to her L LE after a fall at work.  PT assessed her neck and shoulders and found minor weakness in postural and scapular stabilizing musculature with no signs of radicular symptoms in UE.  PT educated patient on findings and recommended patient be seen in acupuncture for her chronic pain and her anxiety surrounding her decline in functioning since her new injury to her L LE.  PT also suggested no aggressive PT until she is further examined for her L LE and a POC for that medically is established.  Referral for acupuncture was initiated and PT on hold pending ortho evaluation.     Patient prognosis is Fair.   Patientt will benefit from skilled outpatient Physical Therapy to address the deficits stated above and in the chart below, provide patient /family education, and to maximize patientt's level of independence.     Plan of care discussed with patient: Yes  Patient's spiritual, cultural and educational needs considered and patient is agreeable to the plan of care and goals as stated below:     Anticipated Barriers for therapy: L LE injury and anxiety     Medical Necessity is demonstrated by the following  History  Co-morbidities and personal factors that may impact the plan of care Co-morbidities:   anxiety and depression    Personal Factors:   coping style  lifestyle  attitudes     moderate   Examination  Body Structures and Functions, activity limitations and participation restrictions that may impact the plan of care Body Regions:   head  neck  back  lower extremities    Body Systems:    ROM  strength  balance  gait    Participation Restrictions:   Currently NWB on L LE with UE crutches     Activity limitations:   Learning and applying knowledge  no deficits    General Tasks and Commands  no deficits    Communication  no deficits    Mobility  lifting and carrying objects  walking  moving around using equipment (WC)    Self care  no  deficits    Domestic Life  shopping  doing house work (cleaning house, washing dishes, laundry)    Interactions/Relationships  basic interpersonal interactions    Life Areas  employment    Community and Social Life  community life  recreation and leisure         moderate   Clinical Presentation evolving clinical presentation with changing clinical characteristics moderate   Decision Making/ Complexity Score: moderate     GOALS: Short Term Goals:  3 weeks  1.Report decreased neck and shoulder pain  < / =  3-4/10  to increase tolerance for ADLs and work related functions.   2. Pt to tolerate HEP to improve tolerance and independence with ADL's    Long Term Goals: 6 weeks  1.Report decreased neck and shoulder pain < / = 1-2/10  to increase tolerance for ADLs and work related functions.   2.Patient goal: return to work with less pain and ability to manage her chronic neck and back pain  3.Increase strength to 4+/5 in  scapular and postural mm  to increase tolerance for ADL and work activities.  4. Pt will report at 30% or less limitation on FOTO  to demonstrate increase in LE function with every day tasks.      PLAN   Plan of care Certification: 2/16/2023 to 3/30/2023.    Outpatient Physical Therapy 1-2 times weekly for 6 weeks to include the following interventions: Manual Therapy, Moist Heat/ Ice, Neuromuscular Re-ed, Patient Education, Self Care, Therapeutic Activities, and Therapeutic Exercise.     Padmini Paul, PT      I CERTIFY THE NEED FOR THESE SERVICES FURNISHED UNDER THIS PLAN OF TREATMENT AND WHILE UNDER MY CARE   Physician's comments:     Physician's Signature: ___________________________________________________

## 2023-02-28 ENCOUNTER — PROCEDURE VISIT (OUTPATIENT)
Dept: NEUROLOGY | Facility: CLINIC | Age: 53
End: 2023-02-28
Payer: MEDICAID

## 2023-02-28 ENCOUNTER — PATIENT MESSAGE (OUTPATIENT)
Dept: NEUROLOGY | Facility: CLINIC | Age: 53
End: 2023-02-28

## 2023-02-28 VITALS
SYSTOLIC BLOOD PRESSURE: 115 MMHG | DIASTOLIC BLOOD PRESSURE: 63 MMHG | RESPIRATION RATE: 17 BRPM | HEIGHT: 62 IN | WEIGHT: 140 LBS | BODY MASS INDEX: 25.76 KG/M2 | HEART RATE: 90 BPM

## 2023-02-28 DIAGNOSIS — G43.719 INTRACTABLE CHRONIC MIGRAINE WITHOUT AURA AND WITHOUT STATUS MIGRAINOSUS: Primary | ICD-10-CM

## 2023-02-28 PROCEDURE — 64615 CHEMODENERV MUSC MIGRAINE: CPT | Mod: PBBFAC,PO | Performed by: NURSE PRACTITIONER

## 2023-02-28 PROCEDURE — 64615 PR CHEMODENERVATION OF MUSCLE FOR CHRONIC MIGRAINE: ICD-10-PCS | Mod: S$PBB,,, | Performed by: NURSE PRACTITIONER

## 2023-02-28 PROCEDURE — 64615 CHEMODENERV MUSC MIGRAINE: CPT | Mod: S$PBB,,, | Performed by: NURSE PRACTITIONER

## 2023-02-28 RX ADMIN — ONABOTULINUMTOXINA 200 UNITS: 100 INJECTION, POWDER, LYOPHILIZED, FOR SOLUTION INTRADERMAL; INTRAMUSCULAR at 10:02

## 2023-02-28 NOTE — PROCEDURES
Procedures    A time out was conducted just before the start of the procedure to verify the correct patient and procedure, procedure location, and all relevant critical information.      Conventional methods of treatment such as multiple medications, both on and   off label have been tried including: Lexapro, trazodone, sertraline, Unable to do antihypertensive ssecondary to low blood pressuer at baseline. Unable to do Depakote due to weight and currently using ozempic for weight loss  Unable to do topiramate secondary to ADHD   Unable to use TCA due to trazodone use ofr sleep and weight.      The patient has been unresponsive and refractory.The patient meets criteria for chronic headaches according to the ICHD-II, the patient has more than 15 headaches a month which last for more than 4 hours a day.     Botox session number: 3  Last session was 12 weeks ago and resulted in improvement of: n/a     I am aiming for at least 50%  improvement in the patient's symptoms. Frequency of treatment is every 3 months unless no response to the treatments, at which time we will discontinue the injections.      DESCRIPTION OF PROCEDURE: After obtaining informed consent and under   aseptic technique, a total of 125 units of botulinum toxin type A were   injected in the following muscles:      -- Procerus 5 units  --  5 units bilaterally  -- Frontalis 20 units  -- Temporalis 20 units bilaterally  -- Occipitalis 15 units bilaterally  -- Upper cervical paraspinals 10 units bilaterally  -- SPARED Trapezius 15 units bilaterally. (Due to weakness)     The patient tolerated the procedure well. There were no complications. The patient was given a prescription for repeat treatment in 12 weeks      Unavoidable waste 75 units    GUANAKITO Barfield

## 2023-04-18 ENCOUNTER — PATIENT MESSAGE (OUTPATIENT)
Dept: PAIN MEDICINE | Facility: CLINIC | Age: 53
End: 2023-04-18
Payer: MEDICAID

## 2023-04-18 DIAGNOSIS — M54.41 CHRONIC BILATERAL LOW BACK PAIN WITH RIGHT-SIDED SCIATICA: Primary | ICD-10-CM

## 2023-04-18 DIAGNOSIS — M54.2 NECK PAIN: ICD-10-CM

## 2023-04-18 DIAGNOSIS — G89.29 CHRONIC BILATERAL LOW BACK PAIN WITH RIGHT-SIDED SCIATICA: Primary | ICD-10-CM

## 2023-04-18 NOTE — TELEPHONE ENCOUNTER
Please cancel next weeks appt and reschedule for 6-8 weeks to allow for time for PT.  New pt order placed.

## 2023-04-19 ENCOUNTER — TELEPHONE (OUTPATIENT)
Dept: PAIN MEDICINE | Facility: CLINIC | Age: 53
End: 2023-04-19
Payer: MEDICAID

## 2023-04-19 NOTE — TELEPHONE ENCOUNTER
Called Ms. Cross to reschedule appointment with Esmer Pedroza, got voicemail, call back number was provided.

## 2023-04-25 ENCOUNTER — CLINICAL SUPPORT (OUTPATIENT)
Dept: REHABILITATION | Facility: HOSPITAL | Age: 53
End: 2023-04-25
Payer: MEDICAID

## 2023-04-25 DIAGNOSIS — M54.41 CHRONIC BILATERAL LOW BACK PAIN WITH RIGHT-SIDED SCIATICA: ICD-10-CM

## 2023-04-25 DIAGNOSIS — G89.29 CHRONIC BILATERAL LOW BACK PAIN WITH RIGHT-SIDED SCIATICA: ICD-10-CM

## 2023-04-25 DIAGNOSIS — R26.9 GAIT ABNORMALITY: ICD-10-CM

## 2023-04-25 DIAGNOSIS — M54.2 NECK PAIN: ICD-10-CM

## 2023-04-25 DIAGNOSIS — R26.89 DECREASED FUNCTIONAL MOBILITY: ICD-10-CM

## 2023-04-25 DIAGNOSIS — M62.81 MUSCLE WEAKNESS: ICD-10-CM

## 2023-04-25 PROCEDURE — 97110 THERAPEUTIC EXERCISES: CPT | Mod: PN

## 2023-04-26 NOTE — PLAN OF CARE
[facility]   Updated Plan of Care and Physical Therapy Treatment Note      Name: Sola Cross  Clinic Number: [id number]    Therapy Diagnosis:   Encounter Diagnoses   Name Primary?    Chronic bilateral low back pain with right-sided sciatica     Neck pain     Muscle weakness     Gait abnormality     Decreased functional mobility      Physician: Esmer Pedroza PA-C    Visit Date: 4/25/2023    Physician Orders: PT Eval and Treat   Medical Diagnosis from Referral: M54.41,G89.29 (ICD-10-CM) - Chronic bilateral low back pain with right-sided sciatica M54.2 (ICD-10-CM) - Neck pain   Evaluation Date: 2/16/2023  Authorization Period Expiration: 4/17/2024  Plan of Care Expiration: 6/23/23  Visit # / Visits authorized: 1/1   FOTO: 1/10      Precautions: Standard, WBAT L LE    Time In: 10:00  Time Out: 11:30  Total Appointment Time (timed & untimed codes): 80 minutes       PTA Visit #: 0/5       Subjective     Pt reports: her lower back hurts the most due to limping from L LE injury.  She states she felt better when she was resting, but now that she is more active she has pain when she goes to sit she gets muscle spasms.  The MD said she may have a meniscus tear to L knee.  Pt states she is doing some LE strengthening from MD.  She is limited with stretches for the back due to restricted L knee ROM.  She has a follow up with Dr. Sandhu on 5/4/23.  She was compliant with home exercise program.  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), everyday    Pain: 3/10, presently, 10/10 at its worst, 0/10 with rest  Location: low back, down R buttock, R anterior thigh, lateral R gastroc    Objective      CMS Impairment/Limitation/Restriction for FOTO Lumbar Spine Survey  Status Limitation G-Code CMS Severity  Modifier  Intake 56% 44% Current Status CK - At least 40 percent but less than 60 percent  Predicted 57% 43% Goal Status+ CK - At least 40 percent but less than 60 percent    Posture: L shouler elevated, R iliac crest elevated, R PSIS more superior     Lumbar ROM: %  Flex: 90 pain into R buttock with return to neutral  Ext: 75 no pain  R SB: 50 pain  L SB: 90 no pain  R rot: 75, L low back  L rot: 50, pain to R buttock      Lower Extremity Strength  Right LE  Left LE    Knee extension: 5/5 Knee extension: 3-/5 decreased ROM   Knee flexion: 5/5 Knee flexion: 3-/5 decreased ROM   Hip flexion: 4/5 Hip flexion: 4/5   Hip extension:  4/5 pain to low back Hip extension: 4/5   Hip abduction: 4/5 Hip abduction: 4/5   Hip adduction: 5/5 Hip adduction 4/5   Ankle dorsiflexion: 5/5 Ankle dorsiflexion: 5/5     TTP to R glute med, R QL, R piriformis    Ely's: + on R at 100 deg, limited L knee ROM    L knee ROM: ext: -6 deg, flex: 109 deg  R knee ROM: ext: 0 deg, flex: 150 deg       Treatment     Sola received the treatments listed below:      therapeutic exercises to develop strength, ROM, flexibility, and core stabilization for 80 minutes including:  Reassessment  Modified push/pull (L hip flex, R hip ext into mat) 3x3 sec  Piriformis stretch 3x20 sec ea  Glute sets x10, 5 sec  Quad sets in long sit x20, 5 sec hold  L Heel prop in long sit with 5# cuff weight proximal to knee and gastroc stretch with strap x3 min  Seated HS stretch 3x20 sec ea  Gait training: pt ambulated with L knee brace. pt required verbal cues for increased L heel strike and L knee extension with L stance    May add: TA set, TA set progressions, SLR, SL hip abd, SL hip add, standing hip ext       Patient Education and Home Exercises       Home Exercises Provided and Patient Education Provided     Education provided:   - role of PT  -importance of L knee extension for ambulation to prevent limp and exacerbation of low back pain  -importance of compliance  with HEP  -avoid overworking  Pt gave verbal understanding to all education provided     Written Home Exercises Provided: yes. Exercises were reviewed and Sola was able to demonstrate them prior to the end of the session.  Sola demonstrated good  understanding of the education provided. See EMR under Patient Instructions for exercises provided during therapy sessions    Assessment     Pt reassessed today.  She has not been to PT since initial eval in January due to following up with MD regarding tibia fracture prior to treatment for low back pain and has not been performing an HEP.  New baseline measurements taken today.  Pt presents with decreased lumbar ROM, decreased L knee ROM, decreased core and LE strength, gait abnormality due to L knee extensor lag, R anterior innominate, tightness to R glute med, R piriformis, R QL, and R lumbar paraspinal.  She was able to tolerate all exercises today without exacerbation of lumbar or L knee pain.  She will benefit for modified push pull for level pelvis, cores stabilization, LE strengthening, improved L knee ROM, gait training.    Sola has not been progressing towards her goals yet due to only attended intial eval in January 2023 and did not have HEP.     Pt prognosis is Good.     Pt will continue to benefit from skilled outpatient physical therapy to address the deficits listed in the problem list box on initial evaluation, provide pt/family education and to maximize pt's level of independence in the home and community environment.     Pt's spiritual, cultural and educational needs considered and pt agreeable to plan of care and goals.     Anticipated barriers to physical therapy: standing 10-13hrs/day at work, L knee injury with L knee extensor lag    Goals:   Short Term Goals:  4 weeks (progressing, not met)  1.Report decreased    low back    pain  <   / =  4  /10 at its worst to increase tolerance for ADLs  2. Pt to increase B Ely's by > or = 10 degrees in order to  improve flexibility and posture.   3. Increased R LE strength by 1/3 muscle grade in all deficient planes to increase tolerance for ADL.  4. Increased L LE strength by 1/3 muscle grade in all deficient planes to increase tolerance for ADLs.  5. Pt to increase B Ely's Test by > or = 10 degrees in order to improve flexibility and posture.   6. Pt to tolerate HEP to improve ROM and independence with ADL's  7. Increased lumbar AROM R SB to at least 75% for increased ease with ADLs.  8. Increased lumbar AROM L rotation to at least 75% for increased ease with ADLs.  9. Pt will be independent with modified push pull to achieve level pelvis for increased ease with ADLs.     Long Term Goals: 6 weeks (progressing, not met)  1.Report decreased    low back    pain  <   / =  2  /10  to increase tolerance for ADLs  2.Pt will ambulate 300+ ft without AD I without significant gait deviations for increased ease with job tasks.   3.Increased R LE strength by 1 muscle grade in all deficient planes to increase tolerance for ADLs.  4. Increased L LE strength by 1 muscle grade in all deficient planes to increase tolerance for ADLs.  6. Pt to be Independent with HEP to improve ROM and independence with ADL's  7. Pt to increase B Ely's Test by > or = 20 degrees in order to improve flexibility and posture.   8. Pt to demonstrate negative Bridge Test in order to show improved core strength for lumbar stabilization.     Plan     Continue PT towards new goals.      Plan of care Certification: 4/25/2023 to 6/23/2023.     Outpatient Physical Therapy 2 times weekly for 8 weeks to include the following interventions: Manual Therapy, Moist Heat/ Ice, Neuromuscular Re-ed, Patient Education, Self Care, Therapeutic Activities, and Therapeutic Exercise, dry needling, electrical stimulation, gait training.    Ann Marie Jhaveri, PT

## 2023-04-27 ENCOUNTER — CLINICAL SUPPORT (OUTPATIENT)
Dept: REHABILITATION | Facility: HOSPITAL | Age: 53
End: 2023-04-27
Payer: MEDICAID

## 2023-04-27 DIAGNOSIS — R26.9 GAIT ABNORMALITY: ICD-10-CM

## 2023-04-27 DIAGNOSIS — M62.81 MUSCLE WEAKNESS: Primary | ICD-10-CM

## 2023-04-27 DIAGNOSIS — R26.89 DECREASED FUNCTIONAL MOBILITY: ICD-10-CM

## 2023-04-27 DIAGNOSIS — M54.41 CHRONIC BILATERAL LOW BACK PAIN WITH RIGHT-SIDED SCIATICA: ICD-10-CM

## 2023-04-27 DIAGNOSIS — G89.29 CHRONIC BILATERAL LOW BACK PAIN WITH RIGHT-SIDED SCIATICA: ICD-10-CM

## 2023-04-27 PROCEDURE — 97110 THERAPEUTIC EXERCISES: CPT | Mod: PN,CQ

## 2023-04-27 NOTE — PROGRESS NOTES
[facility]   Physical Therapy Treatment Note       Name: Sola Cross  Clinic Number: [id number]     Therapy Diagnosis:        Encounter Diagnoses   Name Primary?    Chronic bilateral low back pain with right-sided sciatica      Neck pain      Muscle weakness      Gait abnormality      Decreased functional mobility        Physician: Esmer Pedroza PA-C     Visit Date: 4/25/2023     Physician Orders: PT Eval and Treat   Medical Diagnosis from Referral: M54.41,G89.29 (ICD-10-CM) - Chronic bilateral low back pain with right-sided sciatica M54.2 (ICD-10-CM) - Neck pain   Evaluation Date: 2/16/2023  Authorization Period Expiration: 12/31/2023  Plan of Care Expiration: 6/23/23  Visit # / Visits authorized: 1/20 +POC   FOTO: 1/10      Precautions: Standard, WBAT L LE     Time In: 1355   Time Out: 1435   Total Time: 40 minutes  Total Billable Time: 40 minutes        PTA Visit #: 1/5         Subjective      Pt reports: agitated right today 5-6/10 bilateral low back and right lateral hip and thigh, but pain is not down the knee like it usually is. Has left knee pain and occasional posterior swelling.     She was somewhat compliant with home exercise program 2* lost her copy of her home exercise program.  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), everyday     Pain: 5-6/10, presently, 10/10 at its worst, 0/10 with rest  Location:  bilateral low back, down R buttock, R lateral thigh     Objective       Posture: L shoulder elevated, R iliac crest elevated, R PSIS more superior      Wearing right knee support brace    Left knee extension 4/27/2023: -5*    Treatment      Sola received the treatments listed below:       therapeutic exercises to develop strength, ROM, flexibility, and core stabilization for 40 minutes including:  Modified push/pull (L hip flex, R hip  ext into mat) 3x3 sec  Piriformis stretch 3x20 sec ea  Glute sets x10, 5 sec  Quad sets in long sit x20, 5 sec hold  Transverse abdominis sets x 10, hold 5s  Transverse abdominis sets with march, supine x 10, hold 5s  SLR x 10, hold 5s  Side lying hip abduction x 10  Side lying hip adduction x 10    L Heel prop in long sit with 5# cuff weight proximal to knee and gastroc stretch with strap x3 min  Seated HS stretch 3x20 sec ea  NP-Gait training: pt ambulated with L knee brace. pt required verbal cues for increased L heel strike and L knee extension with L stance     May add:  standing hip ext         Patient Education and Home Exercises        Home Exercises Provided and Patient Education Provided      Education provided:   - role of PT  -importance of L knee extension for ambulation to prevent limp and exacerbation of low back pain  -importance of compliance with HEP  -avoid overworking  Pt gave verbal understanding to all education provided      Written Home Exercises Provided: yes. Issued another copy of prior exercises as well.     Exercises were reviewed and Sola was able to demonstrate them prior to the end of the session.  Sola demonstrated good  understanding of the education provided. See EMR under Patient Instructions for exercises provided during therapy sessions     Assessment      Mild soreness after previous treatment and occasional posterior left knee edema. Improved ROM extension today. Progressed strengthening today with good response. Required cues for core stabilization with exercises. Limited 2* late arrival. She will benefit for modified push pull for level pelvis, core stabilization, LE strengthening, improved L knee ROM, gait training.     Sola is progressing towards her goals.      Pt prognosis is Good.      Pt will continue to benefit from skilled outpatient physical therapy to address the deficits listed in the problem list box on initial evaluation, provide pt/family education and to  maximize pt's level of independence in the home and community environment.      Pt's spiritual, cultural and educational needs considered and pt agreeable to plan of care and goals.     Anticipated barriers to physical therapy: standing 10-13hrs/day at work, L knee injury with L knee extensor lag     Goals:   Short Term Goals:  4 weeks (progressing, not met)  1.Report decreased    low back    pain  <   / =  4  /10 at its worst to increase tolerance for ADLs  2. Pt to increase B Ely's by > or = 10 degrees in order to improve flexibility and posture.   3. Increased R LE strength by 1/3 muscle grade in all deficient planes to increase tolerance for ADL.  4. Increased L LE strength by 1/3 muscle grade in all deficient planes to increase tolerance for ADLs.  5. Pt to increase B Ely's Test by > or = 10 degrees in order to improve flexibility and posture.   6. Pt to tolerate HEP to improve ROM and independence with ADL's  7. Increased lumbar AROM R SB to at least 75% for increased ease with ADLs.  8. Increased lumbar AROM L rotation to at least 75% for increased ease with ADLs.  9. Pt will be independent with modified push pull to achieve level pelvis for increased ease with ADLs.     Long Term Goals: 6 weeks (progressing, not met)  1.Report decreased    low back    pain  <   / =  2  /10  to increase tolerance for ADLs  2.Pt will ambulate 300+ ft without AD I without significant gait deviations for increased ease with job tasks.   3.Increased R LE strength by 1 muscle grade in all deficient planes to increase tolerance for ADLs.  4. Increased L LE strength by 1 muscle grade in all deficient planes to increase tolerance for ADLs.  6. Pt to be Independent with HEP to improve ROM and independence with ADL's  7. Pt to increase B Ely's Test by > or = 20 degrees in order to improve flexibility and posture.   8. Pt to demonstrate negative Bridge Test in order to show improved core strength for lumbar stabilization.      Plan       Continue per POC, progressing as appropriate to achieve stated goals.       Continue with: Plan of care Certification: 4/25/2023 to 6/23/2023.  Outpatient Physical Therapy 2 times weekly for 8 weeks to include the following interventions: Manual Therapy, Moist Heat/ Ice, Neuromuscular Re-ed, Patient Education, Self Care, Therapeutic Activities, and Therapeutic Exercise, dry needling, electrical stimulation, gait training.     Soha Scott, PTA

## 2023-05-02 ENCOUNTER — CLINICAL SUPPORT (OUTPATIENT)
Dept: REHABILITATION | Facility: HOSPITAL | Age: 53
End: 2023-05-02
Payer: MEDICAID

## 2023-05-02 ENCOUNTER — CLINICAL SUPPORT (OUTPATIENT)
Dept: REHABILITATION | Facility: HOSPITAL | Age: 53
End: 2023-05-02
Attending: ORTHOPAEDIC SURGERY
Payer: OTHER MISCELLANEOUS

## 2023-05-02 DIAGNOSIS — R26.9 GAIT ABNORMALITY: Primary | ICD-10-CM

## 2023-05-02 DIAGNOSIS — M62.81 MUSCLE WEAKNESS: Primary | ICD-10-CM

## 2023-05-02 DIAGNOSIS — G89.29 CHRONIC BILATERAL LOW BACK PAIN WITH RIGHT-SIDED SCIATICA: ICD-10-CM

## 2023-05-02 DIAGNOSIS — R26.89 DECREASED FUNCTIONAL MOBILITY: ICD-10-CM

## 2023-05-02 DIAGNOSIS — M54.41 CHRONIC BILATERAL LOW BACK PAIN WITH RIGHT-SIDED SCIATICA: ICD-10-CM

## 2023-05-02 DIAGNOSIS — M25.60 DECREASED RANGE OF MOTION: ICD-10-CM

## 2023-05-02 DIAGNOSIS — R53.1 DECREASED STRENGTH: ICD-10-CM

## 2023-05-02 DIAGNOSIS — M25.562 CHRONIC PAIN OF LEFT KNEE: ICD-10-CM

## 2023-05-02 DIAGNOSIS — R26.9 GAIT ABNORMALITY: ICD-10-CM

## 2023-05-02 DIAGNOSIS — G89.29 CHRONIC PAIN OF LEFT KNEE: ICD-10-CM

## 2023-05-02 PROCEDURE — 97110 THERAPEUTIC EXERCISES: CPT | Mod: PN,CQ

## 2023-05-02 PROCEDURE — 97112 NEUROMUSCULAR REEDUCATION: CPT | Mod: PN

## 2023-05-02 PROCEDURE — 97161 PT EVAL LOW COMPLEX 20 MIN: CPT | Mod: PN

## 2023-05-02 PROCEDURE — 97110 THERAPEUTIC EXERCISES: CPT | Mod: PN

## 2023-05-02 NOTE — PROGRESS NOTES
[facility]   Physical Therapy Treatment Note       Name: Sola Cross  Clinic Number: [id number]     Therapy Diagnosis:        Encounter Diagnoses   Name Primary?    Chronic bilateral low back pain with right-sided sciatica      Neck pain      Muscle weakness      Gait abnormality      Decreased functional mobility        Physician: Esmer Pedroza PA-C     Visit Date: 4/25/2023     Physician Orders: PT Eval and Treat   Medical Diagnosis from Referral: M54.41,G89.29 (ICD-10-CM) - Chronic bilateral low back pain with right-sided sciatica M54.2 (ICD-10-CM) - Neck pain   Evaluation Date: 2/16/2023  Authorization Period Expiration: 12/31/2023  Plan of Care Expiration: 6/23/23  Visit # / Visits authorized:2/20 +POC   FOTO: 1/10      Precautions: Standard, WBAT L LE     Time In: 0920   Time Out: 0959   Total Time: 39 minutes  Total Billable Time: 39 minutes        PTA Visit #: 1/5         Subjective      Pt reports: Friday her back was agitated up to 8/10. Skipped work on Saturday and limited her activity 2* discomfort. Started home exercise program Sunday. Neck started hurting yesterday to the point where it's working into a headache like a migraine. Back is 3-4/10 R>left low back and right buttock and neck is 6-7/10 soreness, hurting at bilateral occiput. Feels limited with left c/s rotation. Possible stress increased neck pain. Feels like she is walking better, feels more normal with less limping. This morning she felt like her knee is flexing better. Got her weights for weighted extension hang stretch.     She was somewhat compliant with home exercise program 2* lost her copy of her home exercise program.  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), everyday     Pain: 3-4/10, presently, 10/10 at its worst, 0/10 with rest  Location:  bilateral low back,  down R buttock, R lateral thigh     Objective       Posture: L shoulder elevated, R iliac crest elevated, R PSIS more superior      Wearing right knee support brace    Left knee extension 4/27/2023: -5*    Treatment      Sola received the treatments listed below:       therapeutic exercises to develop strength, ROM, flexibility, and core stabilization for 24 minutes including:  Modified push/pull (L hip flex, R hip ext into mat) 3x3 sec  NP-Piriformis stretch 3x20 sec ea  Glute sets x10, 5 seconds-NP   Bridges x 10-lateral right thigh pain  NP-Quad sets in long sit x20, 5 sec hold  Transverse abdominis sets x 10, hold 5s  Transverse abdominis sets with march, supine x 10  SLR x 10  Side lying hip abduction x 10  Side lying hip adduction x 10    NP-L Heel prop in long sit with 5# cuff weight proximal to knee and gastroc stretch with strap x3 min  NP-Seated HS stretch 3x20 sec ea  NP-Gait training: pt ambulated with L knee brace. pt required verbal cues for increased L heel strike and L knee extension with L stance     May add:  standing hip ext     Manual therapy was applied for 15 minutes after being cleared of contraindications, in the form of:     Muscle energy techniques for sacroiliac joint mobilization and education on techniques at home and importance of utilization for symptom management and miniskirt principles for maintaining symmetry.           Patient Education and Home Exercises        Home Exercises Provided and Patient Education Provided      Education provided:   - role of PT  -importance of L knee extension for ambulation to prevent limp and exacerbation of low back pain  -importance of compliance with HEP  -avoid overworking  Pt gave verbal understanding to all education provided      Written Home Exercises Provided: Instructed patient to continue current home exercise program.    Exercises were reviewed and Sola was able to demonstrate them prior to the end of the session.  Sola demonstrated  good  understanding of the education provided. See EMR under Patient Instructions for exercises provided during therapy sessions     Assessment      Increased neck pain and lower back pain is improved today. Presented with pelvic dysfunction which required extra time in manual therapy for muscle energy techniques for sacroiliac joint mobilization and correction.  Focus on core strengthening and stabilization with good response. She will benefit for modified push pull for level pelvis, core stabilization, LE strengthening, improved L knee ROM, gait training.     Sola is progressing towards her goals.      Pt prognosis is Good.      Pt will continue to benefit from skilled outpatient physical therapy to address the deficits listed in the problem list box on initial evaluation, provide pt/family education and to maximize pt's level of independence in the home and community environment.      Pt's spiritual, cultural and educational needs considered and pt agreeable to plan of care and goals.     Anticipated barriers to physical therapy: standing 10-13hrs/day at work, L knee injury with L knee extensor lag     Goals:   Short Term Goals:  4 weeks (progressing, not met)  1.Report decreased    low back    pain  <   / =  4  /10 at its worst to increase tolerance for ADLs  2. Pt to increase B Ely's by > or = 10 degrees in order to improve flexibility and posture.   3. Increased R LE strength by 1/3 muscle grade in all deficient planes to increase tolerance for ADL.  4. Increased L LE strength by 1/3 muscle grade in all deficient planes to increase tolerance for ADLs.  5. Pt to increase B Ely's Test by > or = 10 degrees in order to improve flexibility and posture.   6. Pt to tolerate HEP to improve ROM and independence with ADL's  7. Increased lumbar AROM R SB to at least 75% for increased ease with ADLs.  8. Increased lumbar AROM L rotation to at least 75% for increased ease with ADLs.  9. Pt will be independent with  modified push pull to achieve level pelvis for increased ease with ADLs.     Long Term Goals: 6 weeks (progressing, not met)  1.Report decreased    low back    pain  <   / =  2  /10  to increase tolerance for ADLs  2.Pt will ambulate 300+ ft without AD I without significant gait deviations for increased ease with job tasks.   3.Increased R LE strength by 1 muscle grade in all deficient planes to increase tolerance for ADLs.  4. Increased L LE strength by 1 muscle grade in all deficient planes to increase tolerance for ADLs.  6. Pt to be Independent with HEP to improve ROM and independence with ADL's  7. Pt to increase B Ely's Test by > or = 20 degrees in order to improve flexibility and posture.   8. Pt to demonstrate negative Bridge Test in order to show improved core strength for lumbar stabilization.      Plan      Continue per POC, progressing as appropriate to achieve stated goals.       Continue with: Plan of care Certification: 4/25/2023 to 6/23/2023.  Outpatient Physical Therapy 2 times weekly for 8 weeks to include the following interventions: Manual Therapy, Moist Heat/ Ice, Neuromuscular Re-ed, Patient Education, Self Care, Therapeutic Activities, and Therapeutic Exercise, dry needling, electrical stimulation, gait training.     Soha Scott, PTA

## 2023-05-02 NOTE — PLAN OF CARE
[facility]  Physical Therapy Initial Evaluation    Name: Sola Cross  Clinic Number: [id number]    Therapy Diagnosis:   Encounter Diagnoses   Name Primary?    Chronic pain of left knee     Decreased range of motion     Decreased strength     Gait abnormality Yes    Decreased functional mobility      Physician: Kris Cullen,*    Physician Orders: PT Eval and Treat   Medical Diagnosis from Referral: Pain in left knee  Evaluation Date: 5/2/2023  Authorization Period Expiration: 3/9/2024  Plan of Care Expiration: 6/30/23  Visit # / Visits authorized: 1/ 1    Time In: 10:00  Time Out: 10:45  Total Billable Time: 45 minutes    Precautions: Standard    Subjective   Date of onset: 1/21/23  History of current condition - Sola reports: she was NWB to L LE for 8 weeks.  She was using crutches.  She started WBing to L LE between 8-10 weeks after injury.  She states she was limping around without crutches.  She started PT last week for low back and she feels she is walking better since starting PT     Medical History:   Past Medical History:   Diagnosis Date    Adjustment disorder with anxiety 08/06/2015    Anemia 08/06/2015    Attention deficit disorder with hyperactivity(314.01) 08/06/2015    Bronchitis with flu     Complete tear of right rotator cuff 12/27/2016    Depression     General anesthetics causing adverse effect in therapeutic use     Headache     Hypotension, iatrogenic     TMJ (dislocation of temporomandibular joint)        Surgical History:   Sola Cross  has a past surgical history that includes Tonsillectomy; Adenoidectomy; Gastric bypass; Hysterectomy; Colonoscopy; Rotator cuff repair (Right); Shoulder arthroscopy (Right, 12/27/2016); and Breast biopsy.    Medications:   Sola has a current medication list which includes the following prescription(s): cholecalciferol (vitamin d3), cyanocobalamin (vitamin b-12), dextroamphetamine-amphetamine,  dextroamphetamine-amphetamine, dextroamphetamine-amphetamine, dextroamphetamine-amphetamine, dextroamphetamine-amphetamine, dextroamphetamine-amphetamine, diclofenac sodium, escitalopram oxalate, ferrous sulfate, fluticasone propionate, hydrocodone-acetaminophen, hydroxyzine hcl, multivitamin, naproxen sodium, ondansetron, ozempic, spironolactone, sumatriptan, tizanidine, trazodone, and UNABLE TO FIND, and the following Facility-Administered Medications: onabotulinumtoxina and onabotulinumtoxina.    Allergies:   Review of patient's allergies indicates:   Allergen Reactions    Demerol [meperidine] Itching    Niacin preparations Other (See Comments)     RED MAN SYNDRONE    Penicillins Swelling     OF THROAT    Sudafed [pseudoephedrine hcl] Palpitations    Wellbutrin [bupropion hcl] Photosensitivity, Nausea Only, Anxiety, Palpitations and Other (See Comments)     Leg numbness, sorrow, unexplained crying        Imaging, xray (1/21/23): Nondisplaced type 2 tibial plateau fracture.    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  Prior Level of Function: I with ADLs and work  Current Level of Function: difficulty with prolonged standing and walking    Pain:  Current 0/10, worst 4/10, best 0/10   Location: posterior left knee  Description: Throbbing and Tight  Aggravating Factors: prolonged sitting and then stand or knee extension, prolonged standing/walking  Easing Factors: rest, Naproxen    Pts goals: be able to stand and walk for work, be able to kneel on L knee, be able to bend L knee all the way      Objective     Observation: pt is pleasant and cooperative    Gait: pt ambulated into clinic with L knee brace, L knee extensor lag, decreased L stance time    Posture: B knee valgus      Range of Motion: (deg)  Knee Left active Right Active   Flexion 125 tight/pain 147   Extension -2 2  hyper ext       Lower Extremity Strength  Left LE  Right LE    Knee extension: 4/5 Knee extension: 5/5   Knee flexion: 4+/5 Knee flexion: 5/5   Hip flexion: 4+/5 Hip flexion: 5/5   Hip extension:  3+/5 Hip extension: 4/5   Hip abduction: 4-/5 Hip abduction: 4/5   Hip adduction: 4+/5 Hip adduction 5/5   Ankle dorsiflexion: 5/5 Ankle dorsiflexion: 5/5     Function:  - Squat: unable to perform   - Sit <--> Stand:able to stand from chair with UE support   - Bed Mobility: I with all aspects      Joint Mobility:  Patellar: slight hypomobility to L patella (sup/inf)    Palpation: TTP to L knee jt line    Sensation: grossly intact to light touch    Edema: none, but atrophy to L quad    Girth Measurement 5 cm below 10 cm above   Left 30.2 cm 40.2 cm   Right 30.2 cm 38.2 cm     Pt unable to perform FOTO today.      PT Evaluation Completed? Yes  Discussed Plan of Care with patient: Yes      TREATMENT   Treatment Time In: 10:20  Treatment Time Out: 10:45  Total Treatment time separate from Evaluation: 25 minutes    Sola received therapeutic exercises to develop strength, ROM, and flexibility for 15 minutes including:  Heel prop with 5# cuff proximal to knee while performing gastroc stretch with strap x3 min  SLR x10  SL hip abd x10  SL hip add x10  Pt educated on importance of getting full L knee extension for improved gait pattern and quad activation    May add: FOTO, bridges, shuttle, knee flex stretch, gait training, balance training    Sola participated in neuromuscular re-education activities to improve: Sense and Proprioception for 10 minutes. The following activities were included:  Quad sets with Russian estim (4sec on/12 sec off) x10 min    Home Exercises and Patient Education Provided    Education provided:   - role of PT  -importance of compliance with HEP  -importance of full L knee extension ROM  Pt gave verbal understanding to all education provided       Written Home Exercises Provided: yes.  Exercises were  reviewed and Sola was able to demonstrate them prior to the end of the session.  Sola demonstrated good  understanding of the education provided.     See EMR under Patient Instructions for exercises provided 5/2/23.    Assessment   Sola is a 53 y.o. female referred to outpatient Physical Therapy with a medical diagnosis of Pain in left knee. Pt presents with L knee pain, decreased L knee ROM, decreased LE strength, atrophy to L quad, gait abnormality, decreased functional mobility.  She will benefit from PT for NMES for quad neuromuscular re-ed, improved L knee ROM, LE strengthening with focus on quad control, gait training.    Pt prognosis is Good.   Pt will benefit from skilled outpatient Physical Therapy to address the deficits stated above and in the chart below, provide pt/family education, and to maximize pt's level of independence.     Plan of care discussed with patient: Yes  Pt's spiritual, cultural and educational needs considered and patient is agreeable to the plan of care and goals as stated below:     Anticipated Barriers for therapy: none    Medical Necessity is demonstrated by the following  History  Co-morbidities and personal factors that may impact the plan of care Co-morbidities:   anxiety, depression, and low back pain    Personal Factors:   no deficits     high   Examination  Body Structures and Functions, activity limitations and participation restrictions that may impact the plan of care Body Regions:   lower extremities  trunk    Body Systems:    gross symmetry  ROM  strength  balance  gait    Participation Restrictions:        Activity limitations:   Learning and applying knowledge  no deficits    General Tasks and Commands  no deficits    Communication  no deficits    Mobility  lifting and carrying objects  walking    Self care  no deficits    Domestic Life  shopping  cooking  doing house work (cleaning house, washing dishes, laundry)    Interactions/Relationships  no deficits    Life  Areas  employment    Community and Social Life  community life  recreation and leisure         high   Clinical Presentation stable and uncomplicated low   Decision Making/ Complexity Score: low       GOALS:   Short Term Goals:  4 weeks (progressing, not met)  Pt will report decreased L knee pain to 2/10 in order to increase tolerance for ADLs.  Pt will increase L knee extension AROM to 0 degrees in order to show improved functional mobility.  Pt will increase L knee flexion ROM to at least 135-140 degrees in order to show improved functional mobility.  Pt will increase L LE strength by 1/3 muscle grade in all deficient planes for increased ease with ADLs and work activities.  Pt will be independent and consistent with issued HEP in order to show carryover between therapy sessions.  Pt will ambulate 300+ ft without AD without L knee extensor lag.    Long Term Goals: 8 weeks (progressing, not met)  Pt will report decreased L knee pain to 1/10 in order to increase tolerance for ADLs.  Pt will increase L knee flexion ROM to 145 degrees in order to show improved functional mobility.  Pt will increase L LE strength by 1 muscle grade in all deficient planes  in order to increase tolerance to functional activities and ADLs.  Pt will be independent with updated HEP to maintain gains following discharge with therapy.  Pt will perform SLS for 30 seconds each for increased ease and safety with ambulating on uneven surfaces.      Plan   Plan of care Certification: 5/2/2023 to 6/30/23.    Outpatient Physical Therapy 2 times weekly for 8 weeks to include the following interventions: Gait Training, Manual Therapy, Moist Heat/ Ice, Neuromuscular Re-ed, Patient Education, Self Care, Therapeutic Activities, Therapeutic Exercise, and dry needling, electrical stimulation.     Ann Marie Jhaveri, PT

## 2023-05-03 ENCOUNTER — CLINICAL SUPPORT (OUTPATIENT)
Dept: REHABILITATION | Facility: HOSPITAL | Age: 53
End: 2023-05-03
Payer: OTHER MISCELLANEOUS

## 2023-05-03 ENCOUNTER — CLINICAL SUPPORT (OUTPATIENT)
Dept: REHABILITATION | Facility: HOSPITAL | Age: 53
End: 2023-05-03
Payer: MEDICAID

## 2023-05-03 DIAGNOSIS — M25.60 DECREASED RANGE OF MOTION: ICD-10-CM

## 2023-05-03 DIAGNOSIS — M62.81 MUSCLE WEAKNESS: Primary | ICD-10-CM

## 2023-05-03 DIAGNOSIS — S82.102D: Primary | ICD-10-CM

## 2023-05-03 DIAGNOSIS — R53.1 DECREASED STRENGTH: ICD-10-CM

## 2023-05-03 DIAGNOSIS — M54.41 CHRONIC BILATERAL LOW BACK PAIN WITH RIGHT-SIDED SCIATICA: ICD-10-CM

## 2023-05-03 DIAGNOSIS — R26.89 DECREASED FUNCTIONAL MOBILITY: ICD-10-CM

## 2023-05-03 DIAGNOSIS — G89.29 CHRONIC PAIN OF LEFT KNEE: ICD-10-CM

## 2023-05-03 DIAGNOSIS — R26.9 GAIT ABNORMALITY: ICD-10-CM

## 2023-05-03 DIAGNOSIS — G89.29 CHRONIC BILATERAL LOW BACK PAIN WITH RIGHT-SIDED SCIATICA: ICD-10-CM

## 2023-05-03 DIAGNOSIS — M25.562 CHRONIC PAIN OF LEFT KNEE: ICD-10-CM

## 2023-05-03 DIAGNOSIS — R26.9 GAIT ABNORMALITY: Primary | ICD-10-CM

## 2023-05-03 PROCEDURE — 97112 NEUROMUSCULAR REEDUCATION: CPT | Mod: PN,CQ

## 2023-05-03 PROCEDURE — 97110 THERAPEUTIC EXERCISES: CPT | Mod: PN,CQ

## 2023-05-03 NOTE — PROGRESS NOTES
[facility]   Physical Therapy Treatment Note     Name: Sola Cross  Clinic Number: [id number]    Therapy Diagnosis:   Encounter Diagnoses   Name Primary?    Gait abnormality Yes    Decreased functional mobility     Chronic pain of left knee     Decreased range of motion     Decreased strength      Physician: Kris Cullen,*    Visit Date: 5/3/2023    Physician Orders: PT Eval and Treat   Medical Diagnosis from Referral: Pain in left knee  Evaluation Date: 5/2/2023  Authorization Period Expiration: 3/9/2024  Plan of Care Expiration: 6/30/23  Visit # / Visits authorized: 1/ 12 (+eval)      Precautions: Standard    PTA Visit #: 1/5     Time In: 2:35  Time Out: 3:15  Total Billable Time: 40 minutes    SUBJECTIVE     Pt reports: knee doing okay, still getting pain with motion, both ranges.  She was compliant with home exercise program.  Response to previous treatment: felt okay  Functional change: maybe some improvement of ROM    Pain: 2/10  Location: left knee      OBJECTIVE     Objective Measures updated at progress report unless specified.     Treatment     Sola received the treatments listed below:      neuromuscular re-education activities to improve: Sense and Proprioception for 10 minutes. The following activities were included:  Quad set progression: 10x5s towel under knee, 10x5s knee flat, 10x5s towel under ankle  (NP but do again next session --) Quad sets with Russian estim (4sec on/12 sec off) x10 min      therapeutic exercises to develop strength, ROM, and flexibility for 25 minutes including:  Heel prop with 5# cuff proximal to knee while performing gastroc stretch with strap x3 min  Bridges 2 x 10  SLR x13  SL hip abd x13  SL hip add x13  Shuttle press B LE 1.5 bands x 15 - attempted 2 bands but unable to do without painful click  Pt educated on importance of getting full L knee extension for improved gait pattern and quad activation     May add: FOTO,  knee flex  stretch, , TKE with ball on wall, gait training, balance training      manual therapy techniques: Joint mobilizations were applied to the: L knee for 5 minutes, including:  Patellar mobs as needed in all planes      therapeutic activities to improve functional performance for 00  minutes, including:  May add at later session.          Patient Education and Home Exercises     Home Exercises Provided and Patient Education Provided     Education provided:   - emphasis on quad activation and importance of achieving full knee extension  - importance of daily compliance of HEP  - avoidance of pain with exercises as able    Written Home Exercises Provided: Patient instructed to cont prior HEP. Exercises were reviewed and Sola was able to demonstrate them prior to the end of the session.  Sola demonstrated good  understanding of the education provided. See EMR under Patient Instructions in Notes section for exercises provided during therapy sessions    ASSESSMENT     Patient tolerated progression of exercises well overall but continues with pain to the knee with end ranges of motion. Remains limited with end range extension, some hamstring co-contraction noted with quad sets but improved with focus and repetition. Click in the knee noted with shuttle attempt at 2 bands, weight was reduced to 1.5 bands and symptoms improved. Able to perform bridges with good techniques and increased knee flexion as repetitions progressed. She will benefit from further progression as able for ROM, strength and stability.    Sola Is progressing well towards her goals.   Pt prognosis is Good.     Pt will continue to benefit from skilled outpatient physical therapy to address the deficits listed in the problem list box on initial evaluation, provide pt/family education and to maximize pt's level of independence in the home and community environment.     Pt's spiritual, cultural and educational needs considered and pt agreeable to plan of care  and goals.     Anticipated barriers to physical therapy: none    Goals:   Short Term Goals:  4 weeks (progressing, not met)  Pt will report decreased L knee pain to 2/10 in order to increase tolerance for ADLs.  Pt will increase L knee extension AROM to 0 degrees in order to show improved functional mobility.  Pt will increase L knee flexion ROM to at least 135-140 degrees in order to show improved functional mobility.  Pt will increase L LE strength by 1/3 muscle grade in all deficient planes for increased ease with ADLs and work activities.  Pt will be independent and consistent with issued HEP in order to show carryover between therapy sessions.  Pt will ambulate 300+ ft without AD without L knee extensor lag.     Long Term Goals: 8 weeks (progressing, not met)  Pt will report decreased L knee pain to 1/10 in order to increase tolerance for ADLs.  Pt will increase L knee flexion ROM to 145 degrees in order to show improved functional mobility.  Pt will increase L LE strength by 1 muscle grade in all deficient planes  in order to increase tolerance to functional activities and ADLs.  Pt will be independent with updated HEP to maintain gains following discharge with therapy.  Pt will perform SLS for 30 seconds each for increased ease and safety with ambulating on uneven surfaces.    PLAN     Continue with POC toward established physical therapy goals.    Aide Wharton, PTA

## 2023-05-03 NOTE — PROGRESS NOTES
[facility]   Physical Therapy Treatment Note       Name: Sola Cross  Clinic Number: [id number]     Therapy Diagnosis:        Encounter Diagnoses   Name Primary?    Chronic bilateral low back pain with right-sided sciatica      Neck pain      Muscle weakness      Gait abnormality      Decreased functional mobility        Physician: Esmer Pedroza PA-C     Visit Date: 4/25/2023     Physician Orders: PT Eval and Treat   Medical Diagnosis from Referral: M54.41,G89.29 (ICD-10-CM) - Chronic bilateral low back pain with right-sided sciatica M54.2 (ICD-10-CM) - Neck pain   Evaluation Date: 2/16/2023  Authorization Period Expiration: 12/31/2023  Plan of Care Expiration: 6/23/23  Visit # / Visits authorized: 3/20 +POC   FOTO: 1/10      Precautions: Standard, WBAT L LE     Time In: 1520    Time Out: 1605   Total Time: 45 minutes  Total Billable Time: 39 minutes        PTA Visit #: 1/5         Subjective      Pt reports: B low back and right buttock discomfort 6/10. Just finished her PT session for her left knee.      She was somewhat compliant with home exercise program 2* lost her copy of her home exercise program.  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), everyday     Pain: 6/10, presently, 10/10 at its worst, 0/10 with rest  Location:  bilateral low back, down R buttock, R lateral thigh     Objective       Posture: L shoulder elevated, R iliac crest elevated, R PSIS more superior      Wearing right knee support brace    Left knee extension 4/27/2023: -5*    Treatment      Sola received the treatments listed below:       therapeutic exercises to develop strength, ROM, flexibility, and core stabilization for 39 minutes 1:1 supervision including:  Modified push/pull (L hip flex, R hip ext into mat) 3x3 sec  Piriformis stretch 3x20 sec ea  Glute sets x10, 5  seconds-NP   Bridge with yellow band around thighs x 10  Transverse abdominis sets x 10, hold 5s  Transverse abdominis sets with march, supine x 15  Bent knee fall out x 10  Ballsqueeze x 10  Clams, sidelying x 10  Left hip hike in right side lying x 10  LTR x 10    Standing hip extension x 10    NP-Seated HS stretch 3x20 sec ea       Manual therapy was applied for 00 minutes after being cleared of contraindications, in the form of:     Muscle energy techniques for sacroiliac joint mobilization and education on techniques at home and importance of utilization for symptom management and miniskirt principles for maintaining symmetry.        Patient Education and Home Exercises        Home Exercises Provided and Patient Education Provided      Education provided:   - role of PT  -importance of L knee extension for ambulation to prevent limp and exacerbation of low back pain  -importance of compliance with HEP  -avoid overworking  Pt gave verbal understanding to all education provided      Written Home Exercises Provided: Yes, added to current home exercise program.     Exercises were reviewed and Sola was able to demonstrate them prior to the end of the session.  Sola demonstrated good  understanding of the education provided. See EMR under Patient Instructions for exercises provided during therapy sessions     Assessment      Lower back pain increased since last reported after performing left knee therapy session, but no guarding. Presented with pelvic dysfunction which was corrected with push/pulls. Progressed strengthening with cues for proper form and execution and not holding her breath during contractions. Progressed home exercise program accordingly. Focus on core strengthening and stabilization with good response. She will benefit for modified push pull for level pelvis, core stabilization, LE strengthening, improved L knee ROM, gait training.     Sola is progressing towards her goals.      Pt prognosis is  Good.      Pt will continue to benefit from skilled outpatient physical therapy to address the deficits listed in the problem list box on initial evaluation, provide pt/family education and to maximize pt's level of independence in the home and community environment.      Pt's spiritual, cultural and educational needs considered and pt agreeable to plan of care and goals.     Anticipated barriers to physical therapy: standing 10-13hrs/day at work, L knee injury with L knee extensor lag     Goals:   Short Term Goals:  4 weeks (progressing, not met)  1.Report decreased    low back    pain  <   / =  4  /10 at its worst to increase tolerance for ADLs  2. Pt to increase B Ely's by > or = 10 degrees in order to improve flexibility and posture.   3. Increased R LE strength by 1/3 muscle grade in all deficient planes to increase tolerance for ADL.  4. Increased L LE strength by 1/3 muscle grade in all deficient planes to increase tolerance for ADLs.  5. Pt to increase B Ely's Test by > or = 10 degrees in order to improve flexibility and posture.   6. Pt to tolerate HEP to improve ROM and independence with ADL's  7. Increased lumbar AROM R SB to at least 75% for increased ease with ADLs.  8. Increased lumbar AROM L rotation to at least 75% for increased ease with ADLs.  9. Pt will be independent with modified push pull to achieve level pelvis for increased ease with ADLs.     Long Term Goals: 6 weeks (progressing, not met)  1.Report decreased    low back    pain  <   / =  2  /10  to increase tolerance for ADLs  2.Pt will ambulate 300+ ft without AD I without significant gait deviations for increased ease with job tasks.   3.Increased R LE strength by 1 muscle grade in all deficient planes to increase tolerance for ADLs.  4. Increased L LE strength by 1 muscle grade in all deficient planes to increase tolerance for ADLs.  6. Pt to be Independent with HEP to improve ROM and independence with ADL's  7. Pt to increase B Ely's  Test by > or = 20 degrees in order to improve flexibility and posture.   8. Pt to demonstrate negative Bridge Test in order to show improved core strength for lumbar stabilization.      Plan      Continue per POC, progressing as appropriate to achieve stated goals.       Continue with: Plan of care Certification: 4/25/2023 to 6/23/2023.  Outpatient Physical Therapy 2 times weekly for 8 weeks to include the following interventions: Manual Therapy, Moist Heat/ Ice, Neuromuscular Re-ed, Patient Education, Self Care, Therapeutic Activities, and Therapeutic Exercise, dry needling, electrical stimulation, gait training.     Soha Scott, PTA

## 2023-05-08 NOTE — PROGRESS NOTES
[facility]   Physical Therapy Treatment Note       Name: Sola Cross  Clinic Number: [id number]     Therapy Diagnosis:        Encounter Diagnoses   Name Primary?    Chronic bilateral low back pain with right-sided sciatica      Neck pain      Muscle weakness      Gait abnormality      Decreased functional mobility        Physician: Esmer Pedroza PA-C     Visit Date: 4/25/2023     Physician Orders: PT Eval and Treat   Medical Diagnosis from Referral: M54.41,G89.29 (ICD-10-CM) - Chronic bilateral low back pain with right-sided sciatica M54.2 (ICD-10-CM) - Neck pain   Evaluation Date: 2/16/2023  Authorization Period Expiration: 12/31/2023  Plan of Care Expiration: 6/23/23  Visit # / Visits authorized: 4/20 +POC   FOTO: 1/10      Precautions: Standard, WBAT L LE     Time In: 8:40    Time Out: 9:25   Total Time: 45 minutes  Total Billable Time: 45 minutes        PTA Visit #: 1/5         Subjective      Pt reports: she performs HEP, but not as frequently as she should.    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), everyday     Pain: 4/10, presently  Location:  bilateral low back, down R buttock, R lateral thigh to R gastroc     Objective       Posture:  R iliac crest elevated, R PSIS more superior      Wearing right knee support brace    Left knee extension 4/27/2023: -5*    Treatment      Sola received the treatments listed below:       therapeutic exercises to develop strength, ROM, flexibility, and core stabilization for 30 minutes 1:1 supervision including:  Modified push/pull (L hip flex, R hip ext into mat) 3x3 sec  Piriformis stretch 3x20 sec ea  Bridge with Y sport loop for hip abd 2 x 10  Transverse abdominis sets x 10, hold 5s  Transverse abdominis sets with march, supine x 15ea  Bent knee  fall out x 10 ea    Not performed due to time:  Ballsqueeze x 10  Clams, sidelying x 10  Left hip hike in right side lying x 10  LTR x 10  Standing hip extension x 10    NP-Seated HS stretch 3x20 sec ea     Manual therapy was applied for 15 minutes after being cleared of contraindications, in the form of:   Pt cleared of contraindications and verbal and written consent acquired. Pt given option of copy of consent form. Pt educated on benefits and potential side effects of DN. FDN performed to B glute medius (75mm, winding), B piriformis (75mm, winding). FDN performed to reduce pain and muscle tension, promote blood flow, and improve ROM and function. Pt tolerated tx well without adverse effects. Pt was educated on what to expect following the procedure and he verbalized understanding.       Patient Education and Home Exercises        Home Exercises Provided and Patient Education Provided      Education provided:   - role of PT  -importance of L knee extension for ambulation to prevent limp and exacerbation of low back pain  -importance of compliance with HEP  -avoid overworking  Pt gave verbal understanding to all education provided      Written Home Exercises Provided: Yes, added to current home exercise program.     Exercises were reviewed and Sola was able to demonstrate them prior to the end of the session.  Sola demonstrated good  understanding of the education provided. See EMR under Patient Instructions for exercises provided during therapy sessions     Assessment      Pt presented with R anterior innominate, which corrected with contract relax of B piriformis and modified push/pull.  She had pain and tightness to B glute medius and B piriformis, which loosened with dry needling.  She will continue to benefit from PT for modified push pull for level pelvis, core stabilization, LE strengthening, and dry needling/manual.     Sola is progressing towards her goals.      Pt prognosis is Good.      Pt will  continue to benefit from skilled outpatient physical therapy to address the deficits listed in the problem list box on initial evaluation, provide pt/family education and to maximize pt's level of independence in the home and community environment.      Pt's spiritual, cultural and educational needs considered and pt agreeable to plan of care and goals.     Anticipated barriers to physical therapy: standing 10-13hrs/day at work, L knee injury with L knee extensor lag     Goals:   Short Term Goals:  4 weeks (progressing, not met)  1.Report decreased    low back    pain  <   / =  4  /10 at its worst to increase tolerance for ADLs  2. Pt to increase B Ely's by > or = 10 degrees in order to improve flexibility and posture.   3. Increased R LE strength by 1/3 muscle grade in all deficient planes to increase tolerance for ADL.  4. Increased L LE strength by 1/3 muscle grade in all deficient planes to increase tolerance for ADLs.  5. Pt to increase B Ely's Test by > or = 10 degrees in order to improve flexibility and posture.   6. Pt to tolerate HEP to improve ROM and independence with ADL's  7. Increased lumbar AROM R SB to at least 75% for increased ease with ADLs.  8. Increased lumbar AROM L rotation to at least 75% for increased ease with ADLs.  9. Pt will be independent with modified push pull to achieve level pelvis for increased ease with ADLs.     Long Term Goals: 6 weeks (progressing, not met)  1.Report decreased    low back    pain  <   / =  2  /10  to increase tolerance for ADLs  2.Pt will ambulate 300+ ft without AD I without significant gait deviations for increased ease with job tasks.   3.Increased R LE strength by 1 muscle grade in all deficient planes to increase tolerance for ADLs.  4. Increased L LE strength by 1 muscle grade in all deficient planes to increase tolerance for ADLs.  6. Pt to be Independent with HEP to improve ROM and independence with ADL's  7. Pt to increase B Ely's Test by > or = 20  degrees in order to improve flexibility and posture.   8. Pt to demonstrate negative Bridge Test in order to show improved core strength for lumbar stabilization.      Plan      Continue per POC, progressing as appropriate to achieve stated goals.       Continue with: Plan of care Certification: 4/25/2023 to 6/23/2023.  Outpatient Physical Therapy 2 times weekly for 8 weeks to include the following interventions: Manual Therapy, Moist Heat/ Ice, Neuromuscular Re-ed, Patient Education, Self Care, Therapeutic Activities, and Therapeutic Exercise, dry needling, electrical stimulation, gait training.     Ann Marie Jhaveri, PT

## 2023-05-09 ENCOUNTER — CLINICAL SUPPORT (OUTPATIENT)
Dept: REHABILITATION | Facility: HOSPITAL | Age: 53
End: 2023-05-09
Payer: MEDICAID

## 2023-05-09 DIAGNOSIS — R26.9 GAIT ABNORMALITY: ICD-10-CM

## 2023-05-09 DIAGNOSIS — G89.29 CHRONIC BILATERAL LOW BACK PAIN WITH RIGHT-SIDED SCIATICA: ICD-10-CM

## 2023-05-09 DIAGNOSIS — R26.89 DECREASED FUNCTIONAL MOBILITY: ICD-10-CM

## 2023-05-09 DIAGNOSIS — M62.81 MUSCLE WEAKNESS: Primary | ICD-10-CM

## 2023-05-09 DIAGNOSIS — M54.41 CHRONIC BILATERAL LOW BACK PAIN WITH RIGHT-SIDED SCIATICA: ICD-10-CM

## 2023-05-09 PROCEDURE — 97110 THERAPEUTIC EXERCISES: CPT | Mod: PN

## 2023-05-16 ENCOUNTER — PROCEDURE VISIT (OUTPATIENT)
Dept: NEUROLOGY | Facility: CLINIC | Age: 53
End: 2023-05-16
Payer: MEDICAID

## 2023-05-16 VITALS
WEIGHT: 140 LBS | HEART RATE: 96 BPM | SYSTOLIC BLOOD PRESSURE: 112 MMHG | DIASTOLIC BLOOD PRESSURE: 64 MMHG | BODY MASS INDEX: 25.76 KG/M2 | HEIGHT: 62 IN | RESPIRATION RATE: 17 BRPM

## 2023-05-16 DIAGNOSIS — G43.719 INTRACTABLE CHRONIC MIGRAINE WITHOUT AURA AND WITHOUT STATUS MIGRAINOSUS: Primary | ICD-10-CM

## 2023-05-16 PROCEDURE — 96372 THER/PROPH/DIAG INJ SC/IM: CPT | Mod: PBBFAC,PO | Performed by: NURSE PRACTITIONER

## 2023-05-16 PROCEDURE — 64615 PR CHEMODENERVATION OF MUSCLE FOR CHRONIC MIGRAINE: ICD-10-PCS | Mod: S$PBB,,, | Performed by: NURSE PRACTITIONER

## 2023-05-16 PROCEDURE — 64615 CHEMODENERV MUSC MIGRAINE: CPT | Mod: S$PBB,,, | Performed by: NURSE PRACTITIONER

## 2023-05-16 PROCEDURE — 64615 CHEMODENERV MUSC MIGRAINE: CPT | Mod: PBBFAC,PO | Performed by: NURSE PRACTITIONER

## 2023-05-16 RX ORDER — RIZATRIPTAN BENZOATE 10 MG/1
TABLET ORAL
Qty: 9 TABLET | Refills: 11 | Status: SHIPPED | OUTPATIENT
Start: 2023-05-16

## 2023-05-16 RX ADMIN — ONABOTULINUMTOXINA 200 UNITS: 100 INJECTION, POWDER, LYOPHILIZED, FOR SOLUTION INTRADERMAL; INTRAMUSCULAR at 10:05

## 2023-05-16 NOTE — PROCEDURES
Procedures    A time out was conducted just before the start of the procedure to verify the correct patient and procedure, procedure location, and all relevant critical information.      Conventional methods of treatment such as multiple medications, both on and   off label have been tried including: Lexapro, trazodone, sertraline, Unable to do antihypertensive ssecondary to low blood pressuer at baseline. Unable to do Depakote due to weight and currently using ozempic for weight loss  Unable to do topiramate secondary to ADHD   Unable to use TCA due to trazodone use ofr sleep and weight.      The patient has been unresponsive and refractory.The patient meets criteria for chronic headaches according to the ICHD-II, the patient has more than 15 headaches a month which last for more than 4 hours a day.     Botox session number: 4  Last session was 12 weeks ago and resulted in improvement of: 75%     I am aiming for at least 50%  improvement in the patient's symptoms. Frequency of treatment is every 3 months unless no response to the treatments, at which time we will discontinue the injections.      DESCRIPTION OF PROCEDURE: After obtaining informed consent and under   aseptic technique, a total of 125 units of botulinum toxin type A were   injected in the following muscles:      -- Procerus 5 units  --  5 units bilaterally  -- Frontalis 20 units  -- Temporalis 20 units bilaterally  -- Occipitalis 15 units bilaterally  -- Upper cervical paraspinals 10 units bilaterally  -- SPARED Trapezius 15 units bilaterally. (Due to weakness)     The patient tolerated the procedure well. There were no complications. The patient was given a prescription for repeat treatment in 12 weeks      Unavoidable waste 75 units    GUANAKITO Barfield

## 2023-05-17 ENCOUNTER — CLINICAL SUPPORT (OUTPATIENT)
Dept: REHABILITATION | Facility: HOSPITAL | Age: 53
End: 2023-05-17
Payer: OTHER MISCELLANEOUS

## 2023-05-17 ENCOUNTER — CLINICAL SUPPORT (OUTPATIENT)
Dept: REHABILITATION | Facility: HOSPITAL | Age: 53
End: 2023-05-17
Payer: MEDICAID

## 2023-05-17 DIAGNOSIS — M54.41 CHRONIC BILATERAL LOW BACK PAIN WITH RIGHT-SIDED SCIATICA: ICD-10-CM

## 2023-05-17 DIAGNOSIS — R26.9 GAIT ABNORMALITY: ICD-10-CM

## 2023-05-17 DIAGNOSIS — M25.60 DECREASED RANGE OF MOTION: ICD-10-CM

## 2023-05-17 DIAGNOSIS — R53.1 DECREASED STRENGTH: ICD-10-CM

## 2023-05-17 DIAGNOSIS — R26.89 DECREASED FUNCTIONAL MOBILITY: ICD-10-CM

## 2023-05-17 DIAGNOSIS — G89.29 CHRONIC PAIN OF LEFT KNEE: ICD-10-CM

## 2023-05-17 DIAGNOSIS — M25.562 CHRONIC PAIN OF LEFT KNEE: ICD-10-CM

## 2023-05-17 DIAGNOSIS — G89.29 CHRONIC BILATERAL LOW BACK PAIN WITH RIGHT-SIDED SCIATICA: ICD-10-CM

## 2023-05-17 DIAGNOSIS — R26.9 GAIT ABNORMALITY: Primary | ICD-10-CM

## 2023-05-17 DIAGNOSIS — M62.81 MUSCLE WEAKNESS: Primary | ICD-10-CM

## 2023-05-17 PROCEDURE — 97110 THERAPEUTIC EXERCISES: CPT | Mod: PN,CQ

## 2023-05-17 PROCEDURE — 97110 THERAPEUTIC EXERCISES: CPT | Mod: PN

## 2023-05-17 NOTE — PROGRESS NOTES
[facility]   Physical Therapy Treatment Note     Name: Sola Cross  Clinic Number: [id number]    Therapy Diagnosis:   Encounter Diagnoses   Name Primary?    Gait abnormality Yes    Decreased functional mobility     Chronic pain of left knee     Decreased range of motion     Decreased strength        Physician: Kris Cullen,*    Visit Date: 5/17/2023    Physician Orders: PT Eval and Treat   Medical Diagnosis from Referral: Pain in left knee  Evaluation Date: 5/2/2023  Authorization Period Expiration: 3/9/2024  Plan of Care Expiration: 6/30/23  Visit # / Visits authorized: 2/ 12 (+eval)      Precautions: Standard    PTA Visit #: 2/5     Time In: 2:40  Time Out: 3:20  Total Billable Time: 40 minutes    SUBJECTIVE     Pt reports: knee doing fair, good days and bad days, wearing the brace when she's on her feet. Still feels catching in the knee cap sometimes.    She was compliant with home exercise program.  Response to previous treatment: felt okay  Functional change: maybe some improvement of ROM    Pain: 2/10  Location: left knee      OBJECTIVE     Objective Measures updated at progress report unless specified.     Treatment     Sola received the treatments listed below:      neuromuscular re-education activities to improve: Sense and Proprioception for 00 minutes. The following activities were included:  (NP due to improved quad activation --) Quad sets with Russian estim (4sec on/12 sec off) x10 min      therapeutic exercises to develop strength, ROM, and flexibility for 40 minutes including:  Quad set progression: 10x5s towel under knee, 10x5s knee flat, 10x5s towel under ankle  Heel prop with 5# cuff proximal to knee while performing gastroc stretch with strap x3 min  Bridges 2 x 10  SLR x15 (try for 20 next time)  SL hip abd x15 (try for 20 next time)  SL hip add x15 (try for 20 next time)  Standing TKE with ball into wall 10 x 5 sec - increased time to find proper pain  free position  Shuttle press B LE 1.5 bands 2 x 10  Partial squat x 10 - cue to keep within pain free range  Pt educated on importance of getting full L knee extension for improved gait pattern and quad activation     May add: knee flex stretch,  balance training      manual therapy techniques: Joint mobilizations were applied to the: L knee for 0 minutes, including:  Patellar mobs as needed in all planes      therapeutic activities to improve functional performance for 00  minutes, including:  May add at later session.        Patient Education and Home Exercises     Home Exercises Provided and Patient Education Provided     Education provided:   - emphasis on quad activation and importance of achieving full knee extension  - importance of daily compliance of HEP  - avoidance of pain with exercises as able    Written Home Exercises Provided: Patient instructed to cont prior HEP. Exercises were reviewed and Sola was able to demonstrate them prior to the end of the session.  Sola demonstrated good  understanding of the education provided. See EMR under Patient Instructions in Notes section for exercises provided during therapy sessions    ASSESSMENT     Patient with improved  Quad activation during quad sets, did not require NMES this date. Continued discomfort to the anterior knee with shuttle press but able to complete all reps with good mechanics. Apprehensive with partial squats due to feeling of a catching to the L patella. Hip and quad strength very slowly improving. She will benefit from further progression as able for ROM, strength and stability.    Sola Is progressing well towards her goals.   Pt prognosis is Good.     Pt will continue to benefit from skilled outpatient physical therapy to address the deficits listed in the problem list box on initial evaluation, provide pt/family education and to maximize pt's level of independence in the home and community environment.     Pt's spiritual, cultural and  educational needs considered and pt agreeable to plan of care and goals.     Anticipated barriers to physical therapy: none    Goals:   Short Term Goals:  4 weeks (progressing, not met)  Pt will report decreased L knee pain to 2/10 in order to increase tolerance for ADLs.  Pt will increase L knee extension AROM to 0 degrees in order to show improved functional mobility.  Pt will increase L knee flexion ROM to at least 135-140 degrees in order to show improved functional mobility.  Pt will increase L LE strength by 1/3 muscle grade in all deficient planes for increased ease with ADLs and work activities.  Pt will be independent and consistent with issued HEP in order to show carryover between therapy sessions.  Pt will ambulate 300+ ft without AD without L knee extensor lag.     Long Term Goals: 8 weeks (progressing, not met)  Pt will report decreased L knee pain to 1/10 in order to increase tolerance for ADLs.  Pt will increase L knee flexion ROM to 145 degrees in order to show improved functional mobility.  Pt will increase L LE strength by 1 muscle grade in all deficient planes  in order to increase tolerance to functional activities and ADLs.  Pt will be independent with updated HEP to maintain gains following discharge with therapy.  Pt will perform SLS for 30 seconds each for increased ease and safety with ambulating on uneven surfaces.    PLAN     Continue with POC toward established physical therapy goals.    Aide Wharton, PTA

## 2023-05-17 NOTE — PROGRESS NOTES
"            [facility]   Physical Therapy Treatment Note       Name: Sola Cross  Clinic Number: [id number]     Therapy Diagnosis:        Encounter Diagnoses   Name Primary?    Chronic bilateral low back pain with right-sided sciatica      Neck pain      Muscle weakness      Gait abnormality      Decreased functional mobility        Physician: Esmer Pedroza PA-C     Visit Date: 4/25/2023     Physician Orders: PT Eval and Treat   Medical Diagnosis from Referral: M54.41,G89.29 (ICD-10-CM) - Chronic bilateral low back pain with right-sided sciatica M54.2 (ICD-10-CM) - Neck pain   Evaluation Date: 2/16/2023  Authorization Period Expiration: 12/31/2023  Plan of Care Expiration: 6/23/23  Visit # / Visits authorized: 5/20   FOTO: 1/10      Precautions: Standard, WBAT L LE     Time In: 3:21    Time Out: 4:00  Total Time: 39 minutes  Total Billable Time: 39 minutes        PTA Visit #: 1/5         Subjective      Pt reports: having "sciatica" like symptoms down her lateral right leg and wrapping around her knee. She says she is having more pain than usual today due to having to sit more at work. 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), everyday     Pain: 7/10, presently  Location:  bilateral low back, down R buttock, R lateral thigh to R gastroc     Objective       Posture:  R iliac crest elevated, R PSIS more superior      Wearing right knee support brace    Left knee extension 4/27/2023: -5*    Treatment      Sola received the treatments listed below:       therapeutic exercises to develop strength, ROM, flexibility, and core stabilization for 39 minutes 1:1 " supervision including:  Modified push/pull (L hip flex, R hip ext into mat) 3x3 sec  Piriformis stretch 3x20 sec ea  Bridge with Y sport loop for hip abd 2 x 10  Transverse abdominis sets x 10, hold 5s  Transverse abdominis sets with march, supine x 15ea  Bent knee fall out x 10 ea  Ballsqueeze x 10  Clams, sidelying 2x10  Attempted sit to stand from 18in box + foam (Y sport loop for hip abd iso) x5--stopped due to L knee pain   Sh ext + TA set Y tubing 2x10  Rows + TA set Y tubing 2x10    Not performed due to time:  Left hip hike in right side lying x 10  Standing hip extension x 10  NP-Seated HS stretch 3x20 sec ea   LTR x 10    Not performed today: Manual therapy was applied for 00 minutes after being cleared of contraindications, in the form of:   Dry needling not performed today due to pt reported centralization of symptoms and decreased overall pain after contract relax of piriformis and push pull     Patient Education and Home Exercises        Home Exercises Provided and Patient Education Provided      Education provided:   -perform contract relax of piriformis prior to push pull  -importance of compliance with HEP  -avoid overworking  Pt gave verbal understanding to all education provided      Written Home Exercises Provided: Yes, added to current home exercise program.     Exercises were reviewed and Sola was able to demonstrate them prior to the end of the session.  Sola demonstrated good  understanding of the education provided. See EMR under Patient Instructions for exercises provided during therapy sessions  Contract relax of piriformis     Assessment      Pt continues to present with R anterior innominate, which corrected with contract relax of B piriformis and modified push/pull.  Attempted sit to stand, but stopped due to L knee pain despite cues to avoid knee valgus.  She will continue to benefit from PT for modified push pull for level pelvis, core stabilization, LE strengthening, and dry  needling/manual.     Sola is progressing towards her goals.      Pt prognosis is Good.      Pt will continue to benefit from skilled outpatient physical therapy to address the deficits listed in the problem list box on initial evaluation, provide pt/family education and to maximize pt's level of independence in the home and community environment.      Pt's spiritual, cultural and educational needs considered and pt agreeable to plan of care and goals.     Anticipated barriers to physical therapy: standing 10-13hrs/day at work, L knee injury with L knee extensor lag     Goals:   Short Term Goals:  4 weeks (progressing, not met)  1.Report decreased    low back    pain  <   / =  4  /10 at its worst to increase tolerance for ADLs  2. Pt to increase B Ely's by > or = 10 degrees in order to improve flexibility and posture.   3. Increased R LE strength by 1/3 muscle grade in all deficient planes to increase tolerance for ADL.  4. Increased L LE strength by 1/3 muscle grade in all deficient planes to increase tolerance for ADLs.  5. Pt to increase B Ely's Test by > or = 10 degrees in order to improve flexibility and posture.   6. Pt to tolerate HEP to improve ROM and independence with ADL's  7. Increased lumbar AROM R SB to at least 75% for increased ease with ADLs.  8. Increased lumbar AROM L rotation to at least 75% for increased ease with ADLs.  9. Pt will be independent with modified push pull to achieve level pelvis for increased ease with ADLs.     Long Term Goals: 6 weeks (progressing, not met)  1.Report decreased    low back    pain  <   / =  2  /10  to increase tolerance for ADLs  2.Pt will ambulate 300+ ft without AD I without significant gait deviations for increased ease with job tasks.   3.Increased R LE strength by 1 muscle grade in all deficient planes to increase tolerance for ADLs.  4. Increased L LE strength by 1 muscle grade in all deficient planes to increase tolerance for ADLs.  6. Pt to be Independent  with HEP to improve ROM and independence with ADL's  7. Pt to increase B Ely's Test by > or = 20 degrees in order to improve flexibility and posture.   8. Pt to demonstrate negative Bridge Test in order to show improved core strength for lumbar stabilization.      Plan      Continue per POC, progressing as appropriate to achieve stated goals.       Continue with: Plan of care Certification: 4/25/2023 to 6/23/2023.  Outpatient Physical Therapy 2 times weekly for 8 weeks to include the following interventions: Manual Therapy, Moist Heat/ Ice, Neuromuscular Re-ed, Patient Education, Self Care, Therapeutic Activities, and Therapeutic Exercise, dry needling, electrical stimulation, gait training.     Ann Marie Jhaveri, PT

## 2023-05-18 ENCOUNTER — DOCUMENTATION ONLY (OUTPATIENT)
Dept: REHABILITATION | Facility: HOSPITAL | Age: 53
End: 2023-05-18
Payer: MEDICAID

## 2023-05-18 NOTE — PROGRESS NOTES
PT/PTA met face to face to discuss pt's treatment plan and progress towards established goals. Pt will be seen by a physical therapist minimally every 6th visit or every 30 days.      Soha Scott PTA

## 2023-05-19 ENCOUNTER — CLINICAL SUPPORT (OUTPATIENT)
Dept: REHABILITATION | Facility: HOSPITAL | Age: 53
End: 2023-05-19
Payer: MEDICAID

## 2023-05-19 DIAGNOSIS — M62.81 MUSCLE WEAKNESS: Primary | ICD-10-CM

## 2023-05-19 DIAGNOSIS — G89.29 CHRONIC BILATERAL LOW BACK PAIN WITH RIGHT-SIDED SCIATICA: ICD-10-CM

## 2023-05-19 DIAGNOSIS — R26.89 DECREASED FUNCTIONAL MOBILITY: ICD-10-CM

## 2023-05-19 DIAGNOSIS — M54.41 CHRONIC BILATERAL LOW BACK PAIN WITH RIGHT-SIDED SCIATICA: ICD-10-CM

## 2023-05-19 DIAGNOSIS — R26.9 GAIT ABNORMALITY: ICD-10-CM

## 2023-05-19 PROCEDURE — 97110 THERAPEUTIC EXERCISES: CPT | Mod: PN,CQ

## 2023-05-19 NOTE — PROGRESS NOTES
[facility]   Physical Therapy Treatment Note       Name: Sola Cross  Clinic Number: [id number]     Therapy Diagnosis:        Encounter Diagnoses   Name Primary?    Chronic bilateral low back pain with right-sided sciatica      Neck pain      Muscle weakness      Gait abnormality      Decreased functional mobility        Physician: Esmer Pedroza PA-C     Visit Date: 4/25/2023     Physician Orders: PT Eval and Treat   Medical Diagnosis from Referral: M54.41,G89.29 (ICD-10-CM) - Chronic bilateral low back pain with right-sided sciatica M54.2 (ICD-10-CM) - Neck pain   Evaluation Date: 2/16/2023  Authorization Period Expiration: 12/31/2023  Plan of Care Expiration: 6/23/23  Visit # / Visits authorized: 6/20   FOTO: 1/10      Precautions: Standard, WBAT L LE     Time In: 0832   Time Out: 0920   Total Time: 48 minutes  Total Billable Time: 36 minutes        PTA Visit #: 1/5         Subjective      Pt reports: right>left lower back pain and right glute pain . Has neck pain today which is on the verge of becoming a migraine.     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), everyday     Pain:  5/10, presently  Location:  bilateral low back, down R buttock, R lateral thigh to R gastroc     Objective       Posture:  R iliac crest elevated, R PSIS more superior      Wearing right knee support brace    Left knee extension 4/27/2023: -5*    Treatment      Sola received the treatments listed below:       therapeutic exercises to develop strength, ROM, flexibility, and core stabilization for 36 minutes including:  Modified push/pull (L hip flex, R hip ext into mat) 3x3 sec  Piriformis stretch 3x20 sec ea  Bridge with Y sport loop for hip abd 2 x 10  Transverse abdominis sets x 10, hold 5s  Transverse abdominis  sets with march, supine x 20 ea  Bent knee fall out, yellow band 2 x 10 ea  Ballsqueeze x 10  Clams, sidelying 2x10  Attempted sit to stand from 18in box + foam (Y sport loop for hip abd iso) x5--stopped due to L knee pain -NP  Sh ext + TA set Y tubing 2x10  Rows + TA set Y tubing 2x10    Not performed due to time:  Left hip hike in right side lying x 10  Standing hip extension x 10  NP-Seated HS stretch 3x20 sec ea  LTR x 10    Not performed today: Manual therapy was applied for 00 minutes after being cleared of contraindications, in the form of:   Dry needling not performed today due to pt reported centralization of symptoms and decreased overall pain after contract relax of piriformis and push pull     Patient Education and Home Exercises        Home Exercises Provided and Patient Education Provided      Education provided:   -perform contract relax of piriformis prior to push pull  -importance of compliance with HEP  -avoid overworking  Pt gave verbal understanding to all education provided      Written Home Exercises Provided: Instructed patient to continue current home exercise program.      Exercises were reviewed and Sola was able to demonstrate them prior to the end of the session.  Sola demonstrated good  understanding of the education provided. See EMR under Patient Instructions for exercises provided during therapy sessions  Contract relax of piriformis     Assessment      Pt continues to present with R anterior innominate, which corrected with contract relax of B piriformis and modified push/pull x 3. Bilateral low back, right>left and right glute pain, which pt stated is overridden by her neck pain. Pain in low back improved with exercise. Occasional tactile cues for improved core activation with exercises and proper form.  She will continue to benefit from PT for modified push pull for level pelvis, core stabilization, LE strengthening, and dry needling/manual.     Sola is progressing towards her  goals.      Pt prognosis is Good.      Pt will continue to benefit from skilled outpatient physical therapy to address the deficits listed in the problem list box on initial evaluation, provide pt/family education and to maximize pt's level of independence in the home and community environment.      Pt's spiritual, cultural and educational needs considered and pt agreeable to plan of care and goals.     Anticipated barriers to physical therapy: standing 10-13hrs/day at work, L knee injury with L knee extensor lag     Goals:   Short Term Goals:  4 weeks (progressing, not met)  1.Report decreased    low back    pain  <   / =  4  /10 at its worst to increase tolerance for ADLs  2. Pt to increase B Ely's by > or = 10 degrees in order to improve flexibility and posture.   3. Increased R LE strength by 1/3 muscle grade in all deficient planes to increase tolerance for ADL.  4. Increased L LE strength by 1/3 muscle grade in all deficient planes to increase tolerance for ADLs.  5. Pt to increase B Ely's Test by > or = 10 degrees in order to improve flexibility and posture.   6. Pt to tolerate HEP to improve ROM and independence with ADL's  7. Increased lumbar AROM R SB to at least 75% for increased ease with ADLs.  8. Increased lumbar AROM L rotation to at least 75% for increased ease with ADLs.  9. Pt will be independent with modified push pull to achieve level pelvis for increased ease with ADLs.     Long Term Goals: 6 weeks (progressing, not met)  1.Report decreased    low back    pain  <   / =  2  /10  to increase tolerance for ADLs  2.Pt will ambulate 300+ ft without AD I without significant gait deviations for increased ease with job tasks.   3.Increased R LE strength by 1 muscle grade in all deficient planes to increase tolerance for ADLs.  4. Increased L LE strength by 1 muscle grade in all deficient planes to increase tolerance for ADLs.  6. Pt to be Independent with HEP to improve ROM and independence with  ADL's  7. Pt to increase B Ely's Test by > or = 20 degrees in order to improve flexibility and posture.   8. Pt to demonstrate negative Bridge Test in order to show improved core strength for lumbar stabilization.      Plan      Continue per POC, progressing as appropriate to achieve stated goals.       Continue with: Plan of care Certification: 4/25/2023 to 6/23/2023.  Outpatient Physical Therapy 2 times weekly for 8 weeks to include the following interventions: Manual Therapy, Moist Heat/ Ice, Neuromuscular Re-ed, Patient Education, Self Care, Therapeutic Activities, and Therapeutic Exercise, dry needling, electrical stimulation, gait training.     Soha Scott, PTA

## 2023-05-26 ENCOUNTER — CLINICAL SUPPORT (OUTPATIENT)
Dept: REHABILITATION | Facility: HOSPITAL | Age: 53
End: 2023-05-26
Payer: OTHER MISCELLANEOUS

## 2023-05-26 ENCOUNTER — CLINICAL SUPPORT (OUTPATIENT)
Dept: REHABILITATION | Facility: HOSPITAL | Age: 53
End: 2023-05-26
Payer: MEDICAID

## 2023-05-26 DIAGNOSIS — M62.81 MUSCLE WEAKNESS: Primary | ICD-10-CM

## 2023-05-26 DIAGNOSIS — G89.29 CHRONIC BILATERAL LOW BACK PAIN WITH RIGHT-SIDED SCIATICA: ICD-10-CM

## 2023-05-26 DIAGNOSIS — M25.60 DECREASED RANGE OF MOTION: ICD-10-CM

## 2023-05-26 DIAGNOSIS — R26.9 GAIT ABNORMALITY: Primary | ICD-10-CM

## 2023-05-26 DIAGNOSIS — R53.1 DECREASED STRENGTH: ICD-10-CM

## 2023-05-26 DIAGNOSIS — R26.89 DECREASED FUNCTIONAL MOBILITY: ICD-10-CM

## 2023-05-26 DIAGNOSIS — G89.29 CHRONIC PAIN OF LEFT KNEE: ICD-10-CM

## 2023-05-26 DIAGNOSIS — M25.562 CHRONIC PAIN OF LEFT KNEE: ICD-10-CM

## 2023-05-26 DIAGNOSIS — M54.41 CHRONIC BILATERAL LOW BACK PAIN WITH RIGHT-SIDED SCIATICA: ICD-10-CM

## 2023-05-26 DIAGNOSIS — R26.9 GAIT ABNORMALITY: ICD-10-CM

## 2023-05-26 PROCEDURE — 97110 THERAPEUTIC EXERCISES: CPT | Mod: PN,CQ

## 2023-05-26 NOTE — PROGRESS NOTES
[facility]   Physical Therapy Treatment Note       Name: Sola Cross  Clinic Number: [id number]     Therapy Diagnosis:        Encounter Diagnoses   Name Primary?    Chronic bilateral low back pain with right-sided sciatica      Neck pain      Muscle weakness      Gait abnormality      Decreased functional mobility        Physician: Esmer Pedroza PA-C     Visit Date: 4/25/2023     Physician Orders: PT Eval and Treat   Medical Diagnosis from Referral: M54.41,G89.29 (ICD-10-CM) - Chronic bilateral low back pain with right-sided sciatica M54.2 (ICD-10-CM) - Neck pain   Evaluation Date: 2/16/2023  Authorization Period Expiration: 12/31/2023  Plan of Care Expiration: 6/23/23  Visit # / Visits authorized: 7/20   FOTO: 1/10      Precautions: Standard, WBAT L LE     Time In: 0838   Time Out: 0918  Total Time: 40 minutes  Total Billable Time: 40 minutes        PTA Visit #: 2/5         Subjective      Pt reports: 4/10 right piriformis area soreness discomfort today. Yesterday it radiated down her leg while at work, walking. Took naproxen and tylenol to no avail. No neck complaints today.     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), everyday     Pain: 4/10, presently  Location:  right piriformis area     Objective       Posture:  R iliac crest elevated, R PSIS more superior      Wearing right knee support brace    Left knee extension 4/27/2023: -5*    Treatment      Sola received the treatments listed below:       therapeutic exercises to develop strength, ROM, flexibility, and core stabilization for 40 minutes including:  Modified push/pull (L hip flex, R hip ext into mat) 3x3 sec  Piriformis stretch 3x20 sec ea  Bridge with Y sport loop for hip abd 2 x 10  Transverse abdominis sets x 10, hold  5s  Transverse abdominis sets with march, yellow band supine x 20 ea  Bent knee fall out, yellow band 2 x 10 ea  Ballsqueeze x 10  Clams, sidelying , yellow band 2x10  Attempted sit to stand from 18in box + foam (Y sport loop for hip abd iso) x5--stopped due to L knee pain -NP  Sh ext + TA set Y tubing 2x10  Rows + TA set Y tubing 2x10    Not performed due to time:  Left hip hike in right side lying x 10  Standing hip extension x 10  NP-Seated HS stretch 3x20 sec ea  LTR x 10    Not performed today: Manual therapy was applied for 00 minutes after being cleared of contraindications, in the form of:   Dry needling not performed today due to pt reported centralization of symptoms and decreased overall pain after contract relax of piriformis and push pull     Patient Education and Home Exercises        Home Exercises Provided and Patient Education Provided      Education provided:   -perform contract relax of piriformis prior to push pull  -importance of compliance with HEP  -avoid overworking  Pt gave verbal understanding to all education provided      Written Home Exercises Provided: Instructed patient to continue current home exercise program.      Exercises were reviewed and Sola was able to demonstrate them prior to the end of the session.  Sola demonstrated good  understanding of the education provided. See EMR under Patient Instructions for exercises provided during therapy sessions     Assessment      Centralization of symptoms today but did have peripheral right LE symptoms yesterday while walking at work. Limited compliance with muscle energy technique and piriformis contract/relax for management of symptoms, partially 2* limited ability at work. Presented with level pelvis today.  Occasional tactile cues for improved core activation with exercises and proper form. Progressed strengthening and stabilization without pain provocation., tactile cues for proper form.  She will continue to benefit from PT for  modified push pull for level pelvis, core stabilization, LE strengthening, and dry needling/manual.     Sola is progressing towards her goals.      Pt prognosis is Good.      Pt will continue to benefit from skilled outpatient physical therapy to address the deficits listed in the problem list box on initial evaluation, provide pt/family education and to maximize pt's level of independence in the home and community environment.      Pt's spiritual, cultural and educational needs considered and pt agreeable to plan of care and goals.     Anticipated barriers to physical therapy: standing 10-13hrs/day at work, L knee injury with L knee extensor lag     Goals:   Short Term Goals:  4 weeks (progressing, not met)  1.Report decreased    low back    pain  <   / =  4  /10 at its worst to increase tolerance for ADLs  2. Pt to increase B Ely's by > or = 10 degrees in order to improve flexibility and posture.   3. Increased R LE strength by 1/3 muscle grade in all deficient planes to increase tolerance for ADL.  4. Increased L LE strength by 1/3 muscle grade in all deficient planes to increase tolerance for ADLs.  5. Pt to increase B Ely's Test by > or = 10 degrees in order to improve flexibility and posture.   6. Pt to tolerate HEP to improve ROM and independence with ADL's  7. Increased lumbar AROM R SB to at least 75% for increased ease with ADLs.  8. Increased lumbar AROM L rotation to at least 75% for increased ease with ADLs.  9. Pt will be independent with modified push pull to achieve level pelvis for increased ease with ADLs.     Long Term Goals: 6 weeks (progressing, not met)  1.Report decreased    low back    pain  <   / =  2  /10  to increase tolerance for ADLs  2.Pt will ambulate 300+ ft without AD I without significant gait deviations for increased ease with job tasks.   3.Increased R LE strength by 1 muscle grade in all deficient planes to increase tolerance for ADLs.  4. Increased L LE strength by 1 muscle  grade in all deficient planes to increase tolerance for ADLs.  6. Pt to be Independent with HEP to improve ROM and independence with ADL's  7. Pt to increase B Ely's Test by > or = 20 degrees in order to improve flexibility and posture.   8. Pt to demonstrate negative Bridge Test in order to show improved core strength for lumbar stabilization.      Plan      Continue per POC, progressing as appropriate to achieve stated goals.       Continue with: Plan of care Certification: 4/25/2023 to 6/23/2023.  Outpatient Physical Therapy 2 times weekly for 8 weeks to include the following interventions: Manual Therapy, Moist Heat/ Ice, Neuromuscular Re-ed, Patient Education, Self Care, Therapeutic Activities, and Therapeutic Exercise, dry needling, electrical stimulation, gait training.     Soha Scott, PTA

## 2023-05-26 NOTE — PROGRESS NOTES
[facility]   Physical Therapy Treatment Note     Name: Sola Cross  Clinic Number: [id number]    Therapy Diagnosis:   Encounter Diagnoses   Name Primary?    Gait abnormality Yes    Decreased functional mobility     Chronic pain of left knee     Decreased range of motion     Decreased strength        Physician: Kris Cullen,*    Visit Date: 5/26/2023    Physician Orders: PT Eval and Treat   Medical Diagnosis from Referral: Pain in left knee  Evaluation Date: 5/2/2023  Authorization Period Expiration: 3/9/2024  Plan of Care Expiration: 6/30/23  Visit # / Visits authorized: 3/ 12 (+eval)      Precautions: Standard    PTA Visit #: 3/5     Time In: 9:20  Time Out: 10:00  Total Billable Time: 40 minutes    SUBJECTIVE     Pt reports: last few days has been having an increase of posterior knee including proximal calf and distal knee. Unsure if it improves throughout the day because the back pain has been so much worse.    She was compliant with home exercise program.  Response to previous treatment: felt okay  Functional change: maybe some improvement of ROM    Pain: 2/10  Location: left knee      OBJECTIVE     Objective Measures updated at progress report unless specified.     Treatment     Sola received the treatments listed below:      neuromuscular re-education activities to improve: Sense and Proprioception for 00 minutes. The following activities were included:  (NP due to improved quad activation --) Quad sets with Russian estim (4sec on/12 sec off) x10 min      therapeutic exercises to develop strength, ROM, and flexibility for 40 minutes including:  Quad set progression: 10x5s towel under knee, 10x5s knee flat, 10x5s towel under ankle  Heel prop with 5# cuff proximal to knee while performing gastroc stretch with strap x3 min  (NP due to already having done them with her back PT) Bridges 2 x 10  SLR x20   SL hip abd x20   SL hip add x20   Standing TKE with ball into wall 15 x  5 sec   Shuttle press B LE 1.5 bands 2 x 10  Partial wall x 15 - cue to keep within pain free range  Pt educated on importance of getting full L knee extension for improved gait pattern and quad activation     May add: knee flex stretch,  balance training      manual therapy techniques: Joint mobilizations were applied to the: L knee for 0 minutes, including:  Patellar mobs as needed in all planes      therapeutic activities to improve functional performance for 00  minutes, including:  May add at later session.        Patient Education and Home Exercises     Home Exercises Provided and Patient Education Provided     Education provided:   - emphasis on quad activation and importance of achieving full knee extension  - importance of daily compliance of HEP  - avoidance of pain with exercises as able    Written Home Exercises Provided: Patient instructed to cont prior HEP. Exercises were reviewed and Sola was able to demonstrate them prior to the end of the session.  Sola demonstrated good  understanding of the education provided. See EMR under Patient Instructions in Notes section for exercises provided during therapy sessions    ASSESSMENT     Patient is progressing well with quad activation and endurance with all exercises. Less pain overall with ability to increase repetitions of most exercises. Decreased apprehension with closed chain exercises noted. She will benefit from further hip and LE strengthening to improve tolerance to daily and functional activities.     Sola Is progressing well towards her goals.   Pt prognosis is Good.     Pt will continue to benefit from skilled outpatient physical therapy to address the deficits listed in the problem list box on initial evaluation, provide pt/family education and to maximize pt's level of independence in the home and community environment.     Pt's spiritual, cultural and educational needs considered and pt agreeable to plan of care and goals.     Anticipated  barriers to physical therapy: none    Goals:   Short Term Goals:  4 weeks (progressing, not met)  Pt will report decreased L knee pain to 2/10 in order to increase tolerance for ADLs.  Pt will increase L knee extension AROM to 0 degrees in order to show improved functional mobility.  Pt will increase L knee flexion ROM to at least 135-140 degrees in order to show improved functional mobility.  Pt will increase L LE strength by 1/3 muscle grade in all deficient planes for increased ease with ADLs and work activities.  Pt will be independent and consistent with issued HEP in order to show carryover between therapy sessions.  Pt will ambulate 300+ ft without AD without L knee extensor lag.     Long Term Goals: 8 weeks (progressing, not met)  Pt will report decreased L knee pain to 1/10 in order to increase tolerance for ADLs.  Pt will increase L knee flexion ROM to 145 degrees in order to show improved functional mobility.  Pt will increase L LE strength by 1 muscle grade in all deficient planes  in order to increase tolerance to functional activities and ADLs.  Pt will be independent with updated HEP to maintain gains following discharge with therapy.  Pt will perform SLS for 30 seconds each for increased ease and safety with ambulating on uneven surfaces.    PLAN     Continue with POC toward established physical therapy goals.    Aide Wharton, PTA

## 2023-05-30 NOTE — PROGRESS NOTES
"[facility]   Progress Note and Physical Therapy Treatment Note       Name: Sola Cross  Clinic Number: [id number]     Therapy Diagnosis:        Encounter Diagnoses   Name Primary?    Chronic bilateral low back pain with right-sided sciatica      Neck pain      Muscle weakness      Gait abnormality      Decreased functional mobility        Physician: Esmer Pedroza PA-C     Visit Date: 4/25/2023     Physician Orders: PT Eval and Treat   Medical Diagnosis from Referral: M54.41,G89.29 (ICD-10-CM) - Chronic bilateral low back pain with right-sided sciatica M54.2 (ICD-10-CM) - Neck pain   Evaluation Date: 2/16/2023  Authorization Period Expiration: 12/31/2023 (Reassessed 5/31/23)  Plan of Care Expiration: 6/23/23  Visit # / Visits authorized: 8/20   FOTO: 1/10      Precautions: Standard, WBAT L LE     Time In: 9:17   Time Out: 10:05  Total Time:  48 minutes  Total Billable Time:  48 minutes        PTA Visit #: 2/5         Subjective      Pt reports: Hurts everyday; reports starting at 3 and then ending the day at 8 or 9; on her feet for long hours each day and reports consistent pain experience regardless of activity; reports sitting for an extended period causes her back to spasm  She was somewhat compliant with home exercise program   Response to previous treatment: felt "okay"; within an hour she is back to being symptomatic  Functional change: continues to feel pain during her working hours (prolonged standing)     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), everyday     Pain: 4/10, presently  Location:  right piriformis area     Objective       Posture:  R iliac crest elevated, R PSIS more superior      Wearing right knee support brace    Left knee extension 4/27/2023: -5*     Lumbar ROM: %  Flex: 100  Ext: 100  R SB: 50 with pain running down lateral leg  L SB: 90  R rot: 50 w/ pain down leg  L rot: 75 pain down leg     Lower " "Extremity Strength   Right LE   Left LE     Knee extension: 5/5 Knee extension: 3+/5 pain   Knee flexion: 5/5 Knee flexion: 4+/5 pain   Hip flexion: 4+/5 Hip flexion: 4/5   Hip extension:  4+/5 Hip extension: 4/5   Hip abduction: 5/5 Hip abduction: 4+/5   Hip adduction: 5/5 Hip adduction 5/5   Ankle dorsiflexion: 5/5 Ankle dorsiflexion: 5/5       TTP to R glute med    Ely's: + on R at 120 deg, limited L knee ROM     L knee ROM: ext: -1 deg, flex: 131 deg  R knee ROM: ext: 0 deg, flex: 150 deg    Treatment      Sola received the treatments listed below:       therapeutic exercises to develop strength, ROM, flexibility, and core stabilization for 48 minutes including:  Reassessed  Modified push/pull (L hip flex, R hip ext into mat) 3x3 sec  Piriformis stretch 3x20 sec ea  Bridge with Y sport loop for hip abd 2 x 10  Transverse abdominis sets with march, yellow sport loop supine x 20 ea  LTR x 20  Bent knee fall out, yellow sport loop 2 x 10 ea  Clams, sidelying , yellow sport loop 2x10    Not performed due to time:  Left hip hike in right side lying x 10  Standing hip extension x 10  Seated HS stretch 3x20 sec ea  Sh ext + TA set Y tubing 2x10  Rows + TA set Y tubing 2x10  Attempted sit to stand from 18in box + foam (Y sport loop for hip abd iso) x5--stopped due to L knee pain -NP  Ballsqueeze x 10  NP Transverse abdominis sets x 10, hold 5s    Not performed today: Manual therapy was applied for 00 minutes after being cleared of contraindications, in the form of:   Dry needling not performed today due to pt reported centralization of symptoms and decreased overall pain after contract relax of piriformis and push pull    May add: progress supine marching with Yellow sports band to "floating" marches (feet do not rest on mat, only tap)     Patient Education and Home Exercises        Home Exercises Provided and Patient Education Provided      Education provided:   -perform contract relax of piriformis prior to push " pull  -importance of compliance with HEP  -avoid overworking  Pt gave verbal understanding to all education provided      Written Home Exercises Provided: Instructed patient to continue current home exercise program.      Exercises were reviewed and Sola was able to demonstrate them prior to the end of the session.  Sola demonstrated good  understanding of the education provided. See EMR under Patient Instructions for exercises provided during therapy sessions     Assessment      Sola was reassessed today and she demonstrated improvements in hip/LE strength and lumbar ROM. A palpation assessment revealed TTP on the right glute med and decreased sensitivity to palpation in R QL and piriformis. Pt continues to lack full ROM in R lumbar side bending and R rotation and reports radicular symptoms down lateral R leg during these motions. She will continue to benefit from skilled intervention to improve her tolerance to work related activities and return to prior level of function.     Sola is progressing towards her goals.      Pt prognosis is Good.      Pt will continue to benefit from skilled outpatient physical therapy to address the deficits listed in the problem list box on initial evaluation, provide pt/family education and to maximize pt's level of independence in the home and community environment.      Pt's spiritual, cultural and educational needs considered and pt agreeable to plan of care and goals.     Anticipated barriers to physical therapy: standing 10-13hrs/day at work, L knee injury with L knee extensor lag     Goals:   Short Term Goals:  4 weeks (progressing, not met)  1.Report decreased    low back    pain  <   / =  4  /10 at its worst to increase tolerance for ADLs (progressing, not met)  2. Pt to increase B Ely's by > or = 10 degrees in order to improve flexibility and posture. (Met)  3. Increased R LE strength by 1/3 muscle grade in all deficient planes to increase tolerance for ADL. (Met for  all except hip ext)  4. Increased L LE strength by 1/3 muscle grade in all deficient planes to increase tolerance for ADLs. (Met for all except hip ext)  5. Pt to increase B Ely's Test by > or = 10 degrees in order to improve flexibility and posture. (Met)  6. Pt to tolerate HEP to improve ROM and independence with ADL's (met)  7. Increased lumbar AROM R SB to at least 75% for increased ease with ADLs.  8. Increased lumbar AROM L rotation to at least 75% for increased ease with ADLs.  9. Pt will be independent with modified push pull to achieve level pelvis for increased ease with ADLs. (Met)     Long Term Goals: 6 weeks (progressing, not met)  1.Report decreased    low back    pain  <   / =  2  /10  to increase tolerance for ADLs  2.Pt will ambulate 300+ ft without AD I without significant gait deviations for increased ease with job tasks.   3.Increased R LE strength by 1 muscle grade in all deficient planes to increase tolerance for ADLs.  4. Increased L LE strength by 1 muscle grade in all deficient planes to increase tolerance for ADLs.  6. Pt to be Independent with HEP to improve ROM and independence with ADL's  7. Pt to increase B Ely's Test by > or = 20 degrees in order to improve flexibility and posture.   8. Pt to demonstrate negative Bridge Test in order to show improved core strength for lumbar stabilization.      Plan      Continue per POC, progressing as appropriate to achieve stated goals.       Continue with: Plan of care Certification: 4/25/2023 to 6/23/2023.  Outpatient Physical Therapy 2 times weekly for 8 weeks to include the following interventions: Manual Therapy, Moist Heat/ Ice, Neuromuscular Re-ed, Patient Education, Self Care, Therapeutic Activities, and Therapeutic Exercise, dry needling, electrical stimulation, gait training.     Bennett Hinsd, SPT

## 2023-05-31 ENCOUNTER — CLINICAL SUPPORT (OUTPATIENT)
Dept: REHABILITATION | Facility: HOSPITAL | Age: 53
End: 2023-05-31
Payer: OTHER MISCELLANEOUS

## 2023-05-31 ENCOUNTER — CLINICAL SUPPORT (OUTPATIENT)
Dept: REHABILITATION | Facility: HOSPITAL | Age: 53
End: 2023-05-31
Payer: MEDICAID

## 2023-05-31 ENCOUNTER — DOCUMENTATION ONLY (OUTPATIENT)
Dept: REHABILITATION | Facility: HOSPITAL | Age: 53
End: 2023-05-31

## 2023-05-31 DIAGNOSIS — M25.562 CHRONIC PAIN OF LEFT KNEE: ICD-10-CM

## 2023-05-31 DIAGNOSIS — M25.60 DECREASED RANGE OF MOTION: ICD-10-CM

## 2023-05-31 DIAGNOSIS — R26.9 GAIT ABNORMALITY: ICD-10-CM

## 2023-05-31 DIAGNOSIS — M62.81 MUSCLE WEAKNESS: Primary | ICD-10-CM

## 2023-05-31 DIAGNOSIS — R26.89 DECREASED FUNCTIONAL MOBILITY: ICD-10-CM

## 2023-05-31 DIAGNOSIS — R26.9 GAIT ABNORMALITY: Primary | ICD-10-CM

## 2023-05-31 DIAGNOSIS — G89.29 CHRONIC BILATERAL LOW BACK PAIN WITH RIGHT-SIDED SCIATICA: ICD-10-CM

## 2023-05-31 DIAGNOSIS — G89.29 CHRONIC PAIN OF LEFT KNEE: ICD-10-CM

## 2023-05-31 DIAGNOSIS — M54.41 CHRONIC BILATERAL LOW BACK PAIN WITH RIGHT-SIDED SCIATICA: ICD-10-CM

## 2023-05-31 DIAGNOSIS — R53.1 DECREASED STRENGTH: ICD-10-CM

## 2023-05-31 PROCEDURE — 97110 THERAPEUTIC EXERCISES: CPT | Mod: PN,CQ

## 2023-05-31 PROCEDURE — 97110 THERAPEUTIC EXERCISES: CPT | Mod: PN

## 2023-05-31 NOTE — PROGRESS NOTES
"[facility] OUTPATIENT THERAPY AND WELLNESS   Physical Therapy Treatment Note     Name: Sola Cross  Clinic Number: [id number]    Therapy Diagnosis:   Encounter Diagnoses   Name Primary?    Gait abnormality Yes    Decreased functional mobility     Chronic pain of left knee     Decreased range of motion     Decreased strength        Physician: Kris Cullen,*    Visit Date: 5/31/2023    Physician Orders: PT Eval and Treat   Medical Diagnosis from Referral: Pain in left knee  Evaluation Date: 5/2/2023  Authorization Period Expiration: 3/9/2024  Plan of Care Expiration: 6/30/23  Visit # / Visits authorized: 3/ 12 (+eval)      Precautions: Standard    PTA Visit #: 3/5     Time In: 8:30  Time Out: 9:12  Total Billable Time: 42 minutes    SUBJECTIVE     Pt reports: this past Sunday she took a nap and then it felt stuck and could not get it straight. Took several days to "unlock" but eventually did let go. Was painful while it was locked.     She was compliant with home exercise program.  Response to previous treatment: felt okay  Functional change: maybe some improvement of ROM    Pain: 2/10  Location: left knee      OBJECTIVE     Objective Measures updated at progress report unless specified.     Treatment     Sola received the treatments listed below:      neuromuscular re-education activities to improve: Sense and Proprioception for 00 minutes. The following activities were included:  (NP due to improved quad activation --) Quad sets with Russian estim (4sec on/12 sec off) x10 min      therapeutic exercises to develop strength, ROM, and flexibility for 42 minutes including:  Quad set progression: 10x5s towel under knee, 10x5s knee flat, 10x5s towel under ankle  Heel prop with 5# cuff proximal to knee while performing gastroc stretch with strap x3 min  (NP due to already having done them with her back PT) Bridges 2 x 10  SLR x20   SL hip abd x20   SL hip add x20   Standing TKE with ball into wall 15 x 5 sec " "  Shuttle press B LE 2 bands 2 x 10  Partial wall x 15 - cue to keep within pain free range  Goblet squat taps to 18" box + foam x 15  Sidesteps in // bars x 2 laps  SLS 2x20s each  Pt educated on importance of getting full L knee extension for improved gait pattern and quad activation     May add: knee flex stretch,  balance training      manual therapy techniques: Joint mobilizations were applied to the: L knee for 0 minutes, including:  Patellar mobs as needed in all planes      therapeutic activities to improve functional performance for 00  minutes, including:  May add at later session.        Patient Education and Home Exercises     Home Exercises Provided and Patient Education Provided     Education provided:   - emphasis on quad activation and importance of achieving full knee extension  - importance of daily compliance of HEP  - avoidance of pain with exercises as able    Written Home Exercises Provided: Patient instructed to cont prior HEP. Exercises were reviewed and Sola was able to demonstrate them prior to the end of the session.  Sola demonstrated good  understanding of the education provided. See EMR under Patient Instructions in Notes section for exercises provided during therapy sessions    ASSESSMENT     Patient presents to clinic with improving strength and stability despite increased pain and knee locking earlier this week. Able to progress into goblet squats with no weight, sidesteps with no resistance, and single limb balance; patient mildly guarded with new exercises. Able to progress resistance with shuttle press. She will benefit from further hip and LE strengthening to improve tolerance to daily and functional activities.     Sola Is progressing well towards her goals.   Pt prognosis is Good.     Pt will continue to benefit from skilled outpatient physical therapy to address the deficits listed in the problem list box on initial evaluation, provide pt/family education and to maximize " pt's level of independence in the home and community environment.     Pt's spiritual, cultural and educational needs considered and pt agreeable to plan of care and goals.     Anticipated barriers to physical therapy: none    Goals:   Short Term Goals:  4 weeks (progressing, not met)  Pt will report decreased L knee pain to 2/10 in order to increase tolerance for ADLs.  Pt will increase L knee extension AROM to 0 degrees in order to show improved functional mobility.  Pt will increase L knee flexion ROM to at least 135-140 degrees in order to show improved functional mobility.  Pt will increase L LE strength by 1/3 muscle grade in all deficient planes for increased ease with ADLs and work activities.  Pt will be independent and consistent with issued HEP in order to show carryover between therapy sessions.  Pt will ambulate 300+ ft without AD without L knee extensor lag.     Long Term Goals: 8 weeks (progressing, not met)  Pt will report decreased L knee pain to 1/10 in order to increase tolerance for ADLs.  Pt will increase L knee flexion ROM to 145 degrees in order to show improved functional mobility.  Pt will increase L LE strength by 1 muscle grade in all deficient planes  in order to increase tolerance to functional activities and ADLs.  Pt will be independent with updated HEP to maintain gains following discharge with therapy.  Pt will perform SLS for 30 seconds each for increased ease and safety with ambulating on uneven surfaces.    PLAN     Continue with POC toward established physical therapy goals.    Aide Wharton, PTA

## 2023-06-05 NOTE — PROGRESS NOTES
[facility]   Progress Note and Physical Therapy Treatment Note     Name: Sola Cross  Clinic Number: [id number]    Therapy Diagnosis:   Encounter Diagnoses   Name Primary?    Gait abnormality Yes    Decreased functional mobility     Chronic pain of left knee     Decreased range of motion     Decreased strength          Physician: Kris Cullen,*    Visit Date: 6/6/2023    Physician Orders: PT Eval and Treat   Medical Diagnosis from Referral: Pain in left knee  Evaluation Date: 5/2/2023  Authorization Period Expiration: 3/9/2024  Plan of Care Expiration: 6/30/23  Visit # / Visits authorized: 4/ 12 (+eval)      Precautions: Standard    PTA Visit #: 3/5     Time In: 10:10  Time Out: 10:50  Total Billable Time: 40 minutes    SUBJECTIVE     Pt reports: her L knee started hurting more Thursday evening after work.  She states she had to move stuff at work and she has been walking more after work.  She states her knee feels more swollen and tight this morning.    She was compliant with home exercise program.  Response to previous treatment: felt okay  Functional change: maybe some improvement of ROM    Pain: 5/10  Location: left knee      OBJECTIVE     Gait: pt ambulated into clinic with L knee brace, L knee extensor lag, decreased L stance time    Posture: B knee valgus      Range of Motion: (deg)  Knee Left active Right Active   Flexion 128 tight/pain 147   Extension -3 (0 deg post ex) 2 hyper ext       Lower Extremity Strength  Left LE  Right LE    Knee extension: 5/5 Knee extension: 5/5   Knee flexion: 4+/5 Knee flexion: 5/5   Hip flexion: 5/5 Hip flexion: 5/5   Hip extension:  4-/5 Hip extension: 4/5   Hip abduction: 4/5 Hip abduction: 4+/5   Hip adduction: 4+/5 Hip adduction 5/5   Ankle dorsiflexion: 5/5 Ankle dorsiflexion: 5/5     Function:  - Squat: able to perform mini squat without pain   - Sit <--> Stand:able to stand from chair with UE support   - Bed Mobility: I with all  "aspects    SLS: L: 30 sec    CMS Impairment/Limitation/Restriction for FOTO Knee Survey  Status Limitation G-Code CMS Severity Modifier  Intake 63% 37% Current Status CJ - At least 20 percent but less than 40 percent  Predicted 65% 35% Goal Status+ CJ - At least 20 percent but less than 40 percent    Treatment     Sola received the treatments listed below:        therapeutic exercises to develop strength, ROM, and flexibility for 40 minutes including:  Reassessment  Quad set progression: x20, 5 sec hold with knee flat in long sit  Heel prop with 5# cuff proximal to knee while performing gastroc stretch with strap x3 min  Bridges 2 x 10  NP SLR x20   NP SL hip abd x20   NP SL hip add x20   Standing TKE with ball into wall 20 x 5 sec   Shuttle press B LE 2.5 bands 2 x 10  NP Partial wall x 15 - cue to keep within pain free range  Goblet squat taps to 18" box + foam x 15  Sidesteps in // bars x 4 laps Y sport loop proximal to knees   SLS 2x30s each  Pt educated on importance of getting full L knee extension for improved gait pattern and quad activation     May add: knee flex stretch,  balance training      manual therapy techniques: Joint mobilizations were applied to the: L knee for 0 minutes, including:  Patellar mobs as needed in all planes      therapeutic activities to improve functional performance for 00  minutes, including:  May add at later session.        Patient Education and Home Exercises     Home Exercises Provided and Patient Education Provided     Education provided:   - emphasis on quad activation and importance of achieving full knee extension  - importance of daily compliance of HEP  - avoidance of pain with exercises as able    Written Home Exercises Provided: Patient instructed to cont prior HEP. Exercises were reviewed and Sola was able to demonstrate them prior to the end of the session.  Sola demonstrated good  understanding of the education provided. See EMR under Patient Instructions in " Notes section for exercises provided during therapy sessions    ASSESSMENT     Patient reassessed today.  She has improved with increased LE strength.  She presented with increased knee edema and had more difficulty with L knee ROM today.  She was able to achieve 0 deg knee extension after exercises.  She is able to perform squats and sit to stand with increased ease since eval.  She will benefit from further hip and LE strengthening to improve tolerance to daily and functional activities.     Sola Is progressing well towards her goals.   Pt prognosis is Good.     Pt will continue to benefit from skilled outpatient physical therapy to address the deficits listed in the problem list box on initial evaluation, provide pt/family education and to maximize pt's level of independence in the home and community environment.     Pt's spiritual, cultural and educational needs considered and pt agreeable to plan of care and goals.     Anticipated barriers to physical therapy: none    Goals:   Short Term Goals:  4 weeks (progressing, not met)  Pt will report decreased L knee pain to 2/10 in order to increase tolerance for ADLs. (progressing, not met)  Pt will increase L knee extension AROM to 0 degrees in order to show improved functional mobility.(progressing, not met--not consistent)  Pt will increase L knee flexion ROM to at least 135-140 degrees in order to show improved functional mobility. (progressing, not met)  Pt will increase L LE strength by 1/3 muscle grade in all deficient planes for increased ease with ADLs and work activities. (Met in all planes except hip extension)  Pt will be independent and consistent with issued HEP in order to show carryover between therapy sessions.(Met)  Pt will ambulate 300+ ft without AD without L knee extensor lag.     Long Term Goals: 8 weeks (progressing, not met)  Pt will report decreased L knee pain to 1/10 in order to increase tolerance for ADLs.  Pt will increase L knee flexion  ROM to 145 degrees in order to show improved functional mobility.  Pt will increase L LE strength by 1 muscle grade in all deficient planes  in order to increase tolerance to functional activities and ADLs.  Pt will be independent with updated HEP to maintain gains following discharge with therapy.  Pt will perform SLS for 30 seconds each for increased ease and safety with ambulating on uneven surfaces. (Met)    Unmet goals remain appropriate within 4 weeks.    PLAN     Continue with POC toward established physical therapy goals.  Progress L knee ROM and strength as tolerated.    Plan of care Certification: 5/2/2023 to 6/30/23.    Outpatient Physical Therapy 2 times weekly for 8 weeks to include the following interventions: Gait Training, Manual Therapy, Moist Heat/ Ice, Neuromuscular Re-ed, Patient Education, Self Care, Therapeutic Activities, Therapeutic Exercise, and dry needling, electrical stimulation.     Ann Marie Jhaveri, PT

## 2023-06-06 ENCOUNTER — CLINICAL SUPPORT (OUTPATIENT)
Dept: REHABILITATION | Facility: HOSPITAL | Age: 53
End: 2023-06-06
Payer: MEDICAID

## 2023-06-06 DIAGNOSIS — M62.81 MUSCLE WEAKNESS: Primary | ICD-10-CM

## 2023-06-06 DIAGNOSIS — M25.60 DECREASED RANGE OF MOTION: ICD-10-CM

## 2023-06-06 DIAGNOSIS — M54.41 CHRONIC BILATERAL LOW BACK PAIN WITH RIGHT-SIDED SCIATICA: ICD-10-CM

## 2023-06-06 DIAGNOSIS — R26.89 DECREASED FUNCTIONAL MOBILITY: ICD-10-CM

## 2023-06-06 DIAGNOSIS — R26.9 GAIT ABNORMALITY: Primary | ICD-10-CM

## 2023-06-06 DIAGNOSIS — G89.29 CHRONIC BILATERAL LOW BACK PAIN WITH RIGHT-SIDED SCIATICA: ICD-10-CM

## 2023-06-06 DIAGNOSIS — R26.9 GAIT ABNORMALITY: ICD-10-CM

## 2023-06-06 DIAGNOSIS — M25.562 CHRONIC PAIN OF LEFT KNEE: ICD-10-CM

## 2023-06-06 DIAGNOSIS — G89.29 CHRONIC PAIN OF LEFT KNEE: ICD-10-CM

## 2023-06-06 DIAGNOSIS — R53.1 DECREASED STRENGTH: ICD-10-CM

## 2023-06-06 PROCEDURE — 97110 THERAPEUTIC EXERCISES: CPT | Mod: PN

## 2023-06-06 PROCEDURE — 97110 THERAPEUTIC EXERCISES: CPT | Mod: PN,CQ

## 2023-06-06 NOTE — PROGRESS NOTES
" [facility]   Physical Therapy Treatment Note       Name: Sola Cross  Clinic Number: [id number]     Therapy Diagnosis:        Encounter Diagnoses   Name Primary?    Chronic bilateral low back pain with right-sided sciatica      Neck pain      Muscle weakness      Gait abnormality      Decreased functional mobility        Physician: Esmer Pedroza PA-C     Visit Date: 4/25/2023     Physician Orders: PT Eval and Treat   Medical Diagnosis from Referral: M54.41,G89.29 (ICD-10-CM) - Chronic bilateral low back pain with right-sided sciatica M54.2 (ICD-10-CM) - Neck pain   Evaluation Date: 2/16/2023  Authorization Period Expiration: 12/31/2023 (Reassessed 5/31/23)  Plan of Care Expiration: 6/23/23  Visit # / Visits authorized: 9/20   FOTO: 1/10      Precautions: Standard, WBAT L LE     Time In: 1050   Time Out: 1128   Total Time: 38 minutes  Total Billable Time: 38 minutes        PTA Visit #: 1/5         Subjective      Pt reports: no pain in the "sciatica", only across low back today. Last push pull was yesterday. Worked this morning., stressful day.     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), everyday     Pain: 5/10, presently  Location:  right LB     Objective       Posture:  R iliac crest elevated, R PSIS more superior -symmetrical today       Treatment      Sola received the treatments listed below:       therapeutic exercises to develop strength, ROM, flexibility, and core stabilization for 38 minutes including:    Modified push/pull (L hip flex, R hip ext into mat) 3x3 sec  Piriformis stretch 3x20 sec ea  Bridge with Y sport loop for hip abd 2 x 10  Transverse abdominis sets with march, " "yellow sport loop supine x 20 ea-cues for core stabilization to prevent right low back discomfort  LTR x 20  Bent knee fall out, yellow sport loop 2 x 10 ea-left knee tightness afterward  Clams, sidelying , yellow sport loop 2x10  Left hip hike in right side lying x 10    Not performed due to time:  Standing hip extension x 10  Seated HS stretch 3x20 sec ea  Sh ext + TA set Y tubing 2x10  Rows + TA set Y tubing 2x10  Attempted sit to stand from 18in box + foam (Y sport loop for hip abd iso) x5--stopped due to L knee pain -NP  Ballsqueeze x 10  NP Transverse abdominis sets x 10, hold 5s    Not performed today: Manual therapy was applied for 00 minutes after being cleared of contraindications, in the form of:   Dry needling not performed today due to pt reported centralization of symptoms and decreased overall pain after contract relax of piriformis and push pull    May add: progress supine marching with Yellow sports band to "floating" marches (feet do not rest on mat, only tap)     Patient Education and Home Exercises        Home Exercises Provided and Patient Education Provided      Education provided:   -perform contract relax of piriformis prior to push pull  -importance of compliance with HEP  -avoid overworking  Pt gave verbal understanding to all education provided      Written Home Exercises Provided: Instructed patient to continue current home exercise program.      Exercises were reviewed and Sola was able to demonstrate them prior to the end of the session.  Sola demonstrated good  understanding of the education provided. See EMR under Patient Instructions for exercises provided during therapy sessions     Assessment      Pain is centralizing with minimal increase in intensity, possibly 2* increased work stress. Presented with symmetrical pelvis without dysfunction. Did not progress supine march today 2* right lower back pain with cues for core stabilization to prevent premature relaxing. Left knee " tightness discomfort reported after bent knee fall out. Fatigued after standing resistance exercises. She will continue to benefit from skilled intervention to improve her tolerance to work related activities and return to prior level of function.     Sola is progressing towards her goals.      Pt prognosis is Good.      Pt will continue to benefit from skilled outpatient physical therapy to address the deficits listed in the problem list box on initial evaluation, provide pt/family education and to maximize pt's level of independence in the home and community environment.      Pt's spiritual, cultural and educational needs considered and pt agreeable to plan of care and goals.     Anticipated barriers to physical therapy: standing 10-13hrs/day at work, L knee injury with L knee extensor lag     Goals:   Short Term Goals:  4 weeks (progressing, not met)  1.Report decreased    low back    pain  <   / =  4  /10 at its worst to increase tolerance for ADLs (progressing, not met)  2. Pt to increase B Ely's by > or = 10 degrees in order to improve flexibility and posture. (Met)  3. Increased R LE strength by 1/3 muscle grade in all deficient planes to increase tolerance for ADL. (Met for all except hip ext)  4. Increased L LE strength by 1/3 muscle grade in all deficient planes to increase tolerance for ADLs. (Met for all except hip ext)  5. Pt to increase B Ely's Test by > or = 10 degrees in order to improve flexibility and posture. (Met)  6. Pt to tolerate HEP to improve ROM and independence with ADL's (met)  7. Increased lumbar AROM R SB to at least 75% for increased ease with ADLs.  8. Increased lumbar AROM L rotation to at least 75% for increased ease with ADLs.  9. Pt will be independent with modified push pull to achieve level pelvis for increased ease with ADLs. (Met)     Long Term Goals: 6 weeks (progressing, not met)  1.Report decreased    low back    pain  <   / =  2  /10  to increase tolerance for ADLs  2.Pt  will ambulate 300+ ft without AD I without significant gait deviations for increased ease with job tasks.   3.Increased R LE strength by 1 muscle grade in all deficient planes to increase tolerance for ADLs.  4. Increased L LE strength by 1 muscle grade in all deficient planes to increase tolerance for ADLs.  6. Pt to be Independent with HEP to improve ROM and independence with ADL's  7. Pt to increase B Ely's Test by > or = 20 degrees in order to improve flexibility and posture.   8. Pt to demonstrate negative Bridge Test in order to show improved core strength for lumbar stabilization.      Plan      Continue per POC, progressing as appropriate to achieve stated goals.       Continue with: Plan of care Certification: 4/25/2023 to 6/23/2023.  Outpatient Physical Therapy 2 times weekly for 8 weeks to include the following interventions: Manual Therapy, Moist Heat/ Ice, Neuromuscular Re-ed, Patient Education, Self Care, Therapeutic Activities, and Therapeutic Exercise, dry needling, electrical stimulation, gait training.     Soha Scott, PTA

## 2023-06-13 ENCOUNTER — CLINICAL SUPPORT (OUTPATIENT)
Dept: REHABILITATION | Facility: HOSPITAL | Age: 53
End: 2023-06-13
Payer: MEDICAID

## 2023-06-13 DIAGNOSIS — G89.29 CHRONIC PAIN OF LEFT KNEE: ICD-10-CM

## 2023-06-13 DIAGNOSIS — M62.81 MUSCLE WEAKNESS: Primary | ICD-10-CM

## 2023-06-13 DIAGNOSIS — R53.1 DECREASED STRENGTH: ICD-10-CM

## 2023-06-13 DIAGNOSIS — M25.562 CHRONIC PAIN OF LEFT KNEE: ICD-10-CM

## 2023-06-13 DIAGNOSIS — M54.41 CHRONIC BILATERAL LOW BACK PAIN WITH RIGHT-SIDED SCIATICA: ICD-10-CM

## 2023-06-13 DIAGNOSIS — R26.9 GAIT ABNORMALITY: Primary | ICD-10-CM

## 2023-06-13 DIAGNOSIS — R26.89 DECREASED FUNCTIONAL MOBILITY: ICD-10-CM

## 2023-06-13 DIAGNOSIS — G89.29 CHRONIC BILATERAL LOW BACK PAIN WITH RIGHT-SIDED SCIATICA: ICD-10-CM

## 2023-06-13 DIAGNOSIS — R26.9 GAIT ABNORMALITY: ICD-10-CM

## 2023-06-13 DIAGNOSIS — M25.60 DECREASED RANGE OF MOTION: ICD-10-CM

## 2023-06-13 PROCEDURE — 97110 THERAPEUTIC EXERCISES: CPT | Mod: PN,CQ

## 2023-06-13 NOTE — PROGRESS NOTES
"[facility]   Physical Therapy Treatment Note     Name: Sola Cross  Clinic Number: [id number]    Therapy Diagnosis:   Encounter Diagnoses   Name Primary?    Gait abnormality Yes    Decreased functional mobility     Chronic pain of left knee     Decreased range of motion     Decreased strength        Physician: Kris Cullen,*    Visit Date: 6/13/2023    Physician Orders: PT Eval and Treat   Medical Diagnosis from Referral: Pain in left knee  Evaluation Date: 5/2/2023  Authorization Period Expiration: 3/9/2024  Plan of Care Expiration: 6/30/23  Visit # / Visits authorized: 4/ 12 (+eval)      Precautions: Standard    PTA Visit #: 3/5     Time In: 3:25  Time Out: 4:05  Total Billable Time: 40 minutes    SUBJECTIVE     Pt reports: yesterday had a rough day due to her knee pain, felt like it wanted to hyperextend a lot. Doing better today. States she is definitely going to have surgery on her knee, just have to have     She was compliant with home exercise program.  Response to previous treatment: felt okay  Functional change: maybe some improvement of ROM    Pain: 5/10  Location: left knee      OBJECTIVE     Objective measures not taken     Treatment     Sola received the treatments listed below:        therapeutic exercises to develop strength, ROM, and flexibility for 40 minutes including:    Quad set  x20, 5 sec hold with knee flat in long sit  Heel prop with 5# cuff proximal to knee while performing gastroc stretch with strap x3 min  Bridges 2 x 10  SLR x20   NP SL hip abd x20   NP SL hip add x20   LAQ x 10  Standing TKE with ball into wall 20 x 5 sec   Shuttle press B LE 2.5 bands 2 x 10 (NP time)   Goblet squat taps to 18" box + foam x 15  Sidesteps in // bars x 3 laps Y sport loop at ankles   SLS 2x30s each  Pt educated on importance of getting full L knee extension for improved gait pattern and quad activation     May add: knee flex stretch,  balance training      manual " therapy techniques: Joint mobilizations were applied to the: L knee for 0 minutes, including:  Patellar mobs as needed in all planes      therapeutic activities to improve functional performance for 00  minutes, including:  May add at later session.        Patient Education and Home Exercises     Home Exercises Provided and Patient Education Provided     Education provided:   - emphasis on quad activation and importance of achieving full knee extension  - importance of daily compliance of HEP  - avoidance of pain with exercises as able    Written Home Exercises Provided: Patient instructed to cont prior HEP. Exercises were reviewed and Sola was able to demonstrate them prior to the end of the session.  Sola demonstrated good  understanding of the education provided. See EMR under Patient Instructions in Notes section for exercises provided during therapy sessions    ASSESSMENT     Patient tolerated progression of exercises well without symptom provocation. Some discomfort initially with goblet squats but improved with repetition. Fatigue with sidesteps with progression of band to ankles. Progressing with quad definition, muscle activation, and strength. Able to achieve 0 degrees of activation at beginning of session. She will benefit from further hip and LE strengthening to improve tolerance to daily and functional activities as well as prepare for future surgery.    Sola Is progressing well towards her goals.   Pt prognosis is Good.     Pt will continue to benefit from skilled outpatient physical therapy to address the deficits listed in the problem list box on initial evaluation, provide pt/family education and to maximize pt's level of independence in the home and community environment.     Pt's spiritual, cultural and educational needs considered and pt agreeable to plan of care and goals.     Anticipated barriers to physical therapy: none    Goals:   Short Term Goals:  4 weeks (progressing, not met)  Pt will  report decreased L knee pain to 2/10 in order to increase tolerance for ADLs. (progressing, not met)  Pt will increase L knee extension AROM to 0 degrees in order to show improved functional mobility.(progressing, not met--not consistent)  Pt will increase L knee flexion ROM to at least 135-140 degrees in order to show improved functional mobility. (progressing, not met)  Pt will increase L LE strength by 1/3 muscle grade in all deficient planes for increased ease with ADLs and work activities. (Met in all planes except hip extension)  Pt will be independent and consistent with issued HEP in order to show carryover between therapy sessions.(Met)  Pt will ambulate 300+ ft without AD without L knee extensor lag.     Long Term Goals: 8 weeks (progressing, not met)  Pt will report decreased L knee pain to 1/10 in order to increase tolerance for ADLs.  Pt will increase L knee flexion ROM to 145 degrees in order to show improved functional mobility.  Pt will increase L LE strength by 1 muscle grade in all deficient planes  in order to increase tolerance to functional activities and ADLs.  Pt will be independent with updated HEP to maintain gains following discharge with therapy.  Pt will perform SLS for 30 seconds each for increased ease and safety with ambulating on uneven surfaces. (Met)    Unmet goals remain appropriate within 4 weeks.    PLAN     Continue with POC toward established physical therapy goals.  Progress L knee ROM and strength as tolerated.    Plan of care Certification: 5/2/2023 to 6/30/23.    Outpatient Physical Therapy 2 times weekly for 8 weeks to include the following interventions: Gait Training, Manual Therapy, Moist Heat/ Ice, Neuromuscular Re-ed, Patient Education, Self Care, Therapeutic Activities, Therapeutic Exercise, and dry needling, electrical stimulation.     Aide Wharton, PTA

## 2023-06-13 NOTE — PROGRESS NOTES
"            [facility]   Physical Therapy Treatment Note       Name: Sola Cross  Clinic Number: [id number]     Therapy Diagnosis:        Encounter Diagnoses   Name Primary?    Chronic bilateral low back pain with right-sided sciatica      Neck pain      Muscle weakness      Gait abnormality      Decreased functional mobility        Physician: Esmer Pedroza PA-C     Visit Date: 4/25/2023     Physician Orders: PT Eval and Treat   Medical Diagnosis from Referral: M54.41,G89.29 (ICD-10-CM) - Chronic bilateral low back pain with right-sided sciatica M54.2 (ICD-10-CM) - Neck pain   Evaluation Date: 2/16/2023  Authorization Period Expiration: 12/31/2023 (Reassessed 5/31/23)  Plan of Care Expiration: 6/23/23  Visit # / Visits authorized: 9/20   FOTO: 1/10      Precautions: Standard, WBAT L LE     Time In: 1440   Time Out: 1520   Total Time: 40 minutes  Total Billable Time: 40 minutes        PTA Visit #: 2/5         Subjective      Pt reports: "back isn't too bad right now." 2/10 bilateral low back right>left. Did a lot of moving stuff at work today without increase in pain.  Had implantation of hormone pellets on right buttock earlier today. No push/pulls today.     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), everyday     Pain: 2/10, presently  Location:  right LB     Objective       Posture:  R iliac crest elevated, R PSIS more superior -symmetrical today       Treatment      Sola received the treatments listed below:       therapeutic exercises to develop strength, ROM, flexibility, and core stabilization for 40 minutes including:    Modified push/pull (L hip flex, R hip ext into mat) 3x3 sec  Piriformis " "stretch 3x20 sec ea  Bridge with Y sport loop for hip abd 2 x 10  Transverse abdominis sets with floating march, yellow sport loop supine x 20 ea-cues for core stabilization to prevent right low back discomfort  LTR x 20-NP  Bent knee fall out, yellow sport loop 2 x 10 ea-left knee tightness afterward  Clams, sidelying , yellow sport loop 2x10-NP  Left hip hike in right side lying x 10-NP  Sh ext + TA set Y tubing 2x10  Rows + TA set Y tubing 2x10    Not performed due to time:  Standing hip extension x 10  Seated HS stretch 3x20 sec ea  Attempted sit to stand from 18in box + foam (Y sport loop for hip abd iso) x5--stopped due to L knee pain -NP  Ballsqueeze x 10  NP Transverse abdominis sets x 10, hold 5s    Not performed today: Manual therapy was applied for 00 minutes after being cleared of contraindications, in the form of:   Dry needling not performed today due to pt reported centralization of symptoms and decreased overall pain after contract relax of piriformis and push pull    May add: progress supine marching with Yellow sports band to "floating" marches (feet do not rest on mat, only tap)     Patient Education and Home Exercises        Home Exercises Provided and Patient Education Provided      Education provided:   -perform contract relax of piriformis prior to push pull  -importance of compliance with HEP  -avoid overworking  Pt gave verbal understanding to all education provided      Written Home Exercises Provided: Instructed patient to continue current home exercise program.      Exercises were reviewed and Sola was able to demonstrate them prior to the end of the session.  Sola demonstrated good  understanding of the education provided. See EMR under Patient Instructions for exercises provided during therapy sessions     Assessment      Limited exercises today to reduce strain and pressure on right buttock 2* recent hormone pellet injection. Level pelvis today but did have right AR which was " corrected with push/pulls and manual A for muscle energy technique. Able to progress her strengthening and stabilization today without pain provocation. Moderate cues for core stabilization with initial reps, then occasional cues afterward with good carryover. She will continue to benefit from skilled intervention to improve her tolerance to work related activities and return to prior level of function.     Sola is progressing towards her goals.      Pt prognosis is Good.      Pt will continue to benefit from skilled outpatient physical therapy to address the deficits listed in the problem list box on initial evaluation, provide pt/family education and to maximize pt's level of independence in the home and community environment.      Pt's spiritual, cultural and educational needs considered and pt agreeable to plan of care and goals.     Anticipated barriers to physical therapy: standing 10-13hrs/day at work, L knee injury with L knee extensor lag     Goals:   Short Term Goals:  4 weeks (progressing, not met)  1.Report decreased    low back    pain  <   / =  4  /10 at its worst to increase tolerance for ADLs (progressing, not met)  2. Pt to increase B Ely's by > or = 10 degrees in order to improve flexibility and posture. (Met)  3. Increased R LE strength by 1/3 muscle grade in all deficient planes to increase tolerance for ADL. (Met for all except hip ext)  4. Increased L LE strength by 1/3 muscle grade in all deficient planes to increase tolerance for ADLs. (Met for all except hip ext)  5. Pt to increase B Ely's Test by > or = 10 degrees in order to improve flexibility and posture. (Met)  6. Pt to tolerate HEP to improve ROM and independence with ADL's (met)  7. Increased lumbar AROM R SB to at least 75% for increased ease with ADLs.  8. Increased lumbar AROM L rotation to at least 75% for increased ease with ADLs.  9. Pt will be independent with modified push pull to achieve level pelvis for increased ease with  ADLs. (Met)     Long Term Goals: 6 weeks (progressing, not met)  1.Report decreased    low back    pain  <   / =  2  /10  to increase tolerance for ADLs  2.Pt will ambulate 300+ ft without AD I without significant gait deviations for increased ease with job tasks.   3.Increased R LE strength by 1 muscle grade in all deficient planes to increase tolerance for ADLs.  4. Increased L LE strength by 1 muscle grade in all deficient planes to increase tolerance for ADLs.  6. Pt to be Independent with HEP to improve ROM and independence with ADL's  7. Pt to increase B Ely's Test by > or = 20 degrees in order to improve flexibility and posture.   8. Pt to demonstrate negative Bridge Test in order to show improved core strength for lumbar stabilization.      Plan      Continue per POC, progressing as appropriate to achieve stated goals.       Continue with: Plan of care Certification: 4/25/2023 to 6/23/2023.  Outpatient Physical Therapy 2 times weekly for 8 weeks to include the following interventions: Manual Therapy, Moist Heat/ Ice, Neuromuscular Re-ed, Patient Education, Self Care, Therapeutic Activities, and Therapeutic Exercise, dry needling, electrical stimulation, gait training.     Soha Scott, PTA

## 2023-06-19 NOTE — PROGRESS NOTES
"[facility]   Physical Therapy Treatment Note     Name: Sola Cross  Clinic Number: [id number]    Therapy Diagnosis:   Encounter Diagnoses   Name Primary?    Gait abnormality Yes    Decreased functional mobility     Chronic pain of left knee     Decreased range of motion     Decreased strength          Physician: Kris Cullen,*    Visit Date: 6/20/2023    Physician Orders: PT Eval and Treat   Medical Diagnosis from Referral: Pain in left knee  Evaluation Date: 5/2/2023  Authorization Period Expiration: 3/9/2024  Plan of Care Expiration: 6/30/23  Visit # / Visits authorized: 5/ 12 (+eval)      Precautions: Standard    PTA Visit #: 3/5     Time In: 9:15  Time Out: 10:00  Total Billable Time: 42 minutes    SUBJECTIVE     Pt reports: she's had increased catching to L knee.  She has an MRI scheduled for L knee tomorrow and then she will schedule surgery for L knee.  She states her L knee has been "locked" and she cannot straighten it.    She was compliant with home exercise program.  Response to previous treatment: felt okay  Functional change: maybe some improvement of ROM    Pain: 0/10, 6-7/10 at its worst  Location: left knee      OBJECTIVE     L knee ext: -10 deg prior to tx,  -2 deg post exercises    Treatment     Sola received the treatments listed below:        therapeutic exercises to develop strength, ROM, and flexibility for 40 minutes including:    Quad set  x20, 5 sec hold with knee flat in long sit  Heel prop with 5# cuff proximal to knee while performing gastroc stretch with strap x3 min  Bridges 2 x 10  SLR x20   SL hip abd x20   SL hip add x20   NP LAQ x 10  Standing TKE with ball into wall 20 x 5 sec   Shuttle press B LE 2.5 bands 2 x 10   NP Goblet squat taps to 18" box + foam x 15  Sidesteps in // bars x 3 laps Y sport loop at ankles   NP SLS 2x30s each  Pt educated on importance of getting full L knee extension for improved gait pattern and quad activation   " "  May add: knee flex stretch,  balance training      manual therapy techniques: Joint mobilizations were applied to the: L knee for 2 minutes, including:  Patellar mobs as needed in all planes      therapeutic activities to improve functional performance for 00  minutes, including:  May add at later session.        Patient Education and Home Exercises     Home Exercises Provided and Patient Education Provided     Education provided:   - emphasis on quad activation and importance of achieving full knee extension  - importance of daily compliance of HEP  - avoidance of pain with exercises as able    Written Home Exercises Provided: Patient instructed to cont prior HEP. Exercises were reviewed and Sola was able to demonstrate them prior to the end of the session.  Sola demonstrated good  understanding of the education provided. See EMR under Patient Instructions in Notes section for exercises provided during therapy sessions    ASSESSMENT     Patient presented with decreased L knee extension ROM and report of "locking."  She improved with L knee extension by end of session.  She presented with increased L knee extensor lag with standing and walking today.  She will benefit from further hip and LE strengthening to improve tolerance to daily and functional activities as well as prepare for future surgery.    Sola Is progressing well towards her goals.   Pt prognosis is Good.     Pt will continue to benefit from skilled outpatient physical therapy to address the deficits listed in the problem list box on initial evaluation, provide pt/family education and to maximize pt's level of independence in the home and community environment.     Pt's spiritual, cultural and educational needs considered and pt agreeable to plan of care and goals.     Anticipated barriers to physical therapy: none    Goals:   Short Term Goals:  4 weeks   Pt will report decreased L knee pain to 2/10 in order to increase tolerance for ADLs. " (progressing, not met)  Pt will increase L knee extension AROM to 0 degrees in order to show improved functional mobility.(progressing, not met--not consistent)  Pt will increase L knee flexion ROM to at least 135-140 degrees in order to show improved functional mobility. (progressing, not met)  Pt will increase L LE strength by 1/3 muscle grade in all deficient planes for increased ease with ADLs and work activities. (Met in all planes except hip extension)  Pt will be independent and consistent with issued HEP in order to show carryover between therapy sessions.(Met)  Pt will ambulate 300+ ft without AD without L knee extensor lag.     Long Term Goals: 8 weeks (progressing, not met)  Pt will report decreased L knee pain to 1/10 in order to increase tolerance for ADLs.  Pt will increase L knee flexion ROM to 145 degrees in order to show improved functional mobility.  Pt will increase L LE strength by 1 muscle grade in all deficient planes  in order to increase tolerance to functional activities and ADLs.  Pt will be independent with updated HEP to maintain gains following discharge with therapy.  Pt will perform SLS for 30 seconds each for increased ease and safety with ambulating on uneven surfaces. (Met)    PLAN     Continue with POC toward established physical therapy goals.  Progress L knee ROM and strength as tolerated.    Plan of care Certification: 5/2/2023 to 6/30/23.    Outpatient Physical Therapy 2 times weekly for 8 weeks to include the following interventions: Gait Training, Manual Therapy, Moist Heat/ Ice, Neuromuscular Re-ed, Patient Education, Self Care, Therapeutic Activities, Therapeutic Exercise, and dry needling, electrical stimulation.     Ann Marie Jhaveri, PT

## 2023-06-20 ENCOUNTER — CLINICAL SUPPORT (OUTPATIENT)
Dept: REHABILITATION | Facility: HOSPITAL | Age: 53
End: 2023-06-20
Payer: MEDICAID

## 2023-06-20 DIAGNOSIS — R26.9 GAIT ABNORMALITY: Primary | ICD-10-CM

## 2023-06-20 DIAGNOSIS — G89.29 CHRONIC BILATERAL LOW BACK PAIN WITH RIGHT-SIDED SCIATICA: ICD-10-CM

## 2023-06-20 DIAGNOSIS — R26.89 DECREASED FUNCTIONAL MOBILITY: ICD-10-CM

## 2023-06-20 DIAGNOSIS — M54.41 CHRONIC BILATERAL LOW BACK PAIN WITH RIGHT-SIDED SCIATICA: ICD-10-CM

## 2023-06-20 DIAGNOSIS — R26.9 GAIT ABNORMALITY: ICD-10-CM

## 2023-06-20 DIAGNOSIS — M25.60 DECREASED RANGE OF MOTION: ICD-10-CM

## 2023-06-20 DIAGNOSIS — M62.81 MUSCLE WEAKNESS: Primary | ICD-10-CM

## 2023-06-20 DIAGNOSIS — R53.1 DECREASED STRENGTH: ICD-10-CM

## 2023-06-20 DIAGNOSIS — G89.29 CHRONIC PAIN OF LEFT KNEE: ICD-10-CM

## 2023-06-20 DIAGNOSIS — M25.562 CHRONIC PAIN OF LEFT KNEE: ICD-10-CM

## 2023-06-20 PROCEDURE — 97110 THERAPEUTIC EXERCISES: CPT | Mod: PN

## 2023-06-26 NOTE — PROGRESS NOTES
[facility]   Physical Therapy Treatment Note       Name: Sola Cross  Clinic Number: [id number]     Therapy Diagnosis:        Encounter Diagnoses   Name Primary?    Chronic bilateral low back pain with right-sided sciatica      Neck pain      Muscle weakness      Gait abnormality      Decreased functional mobility        Physician: Esmer Pedroza PA-C     Visit Date: 4/25/2023     Physician Orders: PT Eval and Treat   Medical Diagnosis from Referral: M54.41,G89.29 (ICD-10-CM) - Chronic bilateral low back pain with right-sided sciatica M54.2 (ICD-10-CM) - Neck pain   Evaluation Date: 2/16/2023  Authorization Period Expiration: 12/31/2023 (Reassessed 6/20/23)  Plan of Care Expiration: 7/21/23  Visit # / Visits authorized: 11/20   FOTO: 1/10      Precautions: Standard, WBAT L LE     Time In: 1003 3 minimal BR break  Time Out: 1045   Total Time: 38 minutes   Total Billable Time: 19 minutes        PTA Visit #: 1/5         Subjective      Pt reports: had a lot of pain over the weekend but not having much pain presently.       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), everyday     Pain: 1-2/10, presently  Location:  right LB     Objective       Posture:  R iliac crest elevated, R PSIS more superior -symmetrical today       Treatment      Sola received the treatments listed below:       therapeutic exercises to develop strength, ROM, flexibility, and core stabilization for 19 minutes 1:1 supervision including:    Modified push/pull (L hip flex, R hip ext into mat) 3x3 sec  Piriformis stretch 3x20 sec ea  Bridge with Y sport loop for hip abd 2 x 10  Transverse abdominis sets with floating march, yellow sport loop supine x 20 ea-cues for core  stabilization to prevent right low back discomfort  Clams, sidelying , yellow sport loop 2x10--stopped due to pain to L IT band  Prone hip ext 2x10 ea-pain, so stopped  Pallof press Y tubing x10 ea  Sh ext + TA set Y tubing 2x10  Rows + TA set B tubing 2x10  Bent knee fall out, red band 2 x 10 ea-left knee tightness afterward    Not performed due to time:  Left hip hike in right side lying x 10-NP  Standing hip extension x 10  Seated HS stretch 3x20 sec ea     Patient Education and Home Exercises        Home Exercises Provided and Patient Education Provided      Education provided:   -perform contract relax of piriformis prior to push pull  -importance of compliance with HEP  -avoid overworking  Pt gave verbal understanding to all education provided      Written Home Exercises Provided: Instructed patient to continue current home exercise program.      Exercises were reviewed and Sola was able to demonstrate them prior to the end of the session.  Sola demonstrated good  understanding of the education provided. See EMR under Patient Instructions for exercises provided during therapy sessions     Assessment      Had a lot of pain over the weekend without known cause, but pain is better today.   Cues for increased glute and core activation with prone hip extension, but stopped 2* right LE sciatic radiating symptoms which improved afterward. Cues for proper form of standing exercises with good correction. Motivated. No iliotibial band pain with clams today. Progressed strengthening with good tollerance. Aware of importance of not pushing through pain. She will continue to benefit from skilled intervention to improve her tolerance to work related activities and return to prior level of function.     Sola is progressing towards her goals.      Pt prognosis is Good.      Pt will continue to benefit from skilled outpatient physical therapy to address the deficits listed in the problem list box on initial evaluation,  provide pt/family education and to maximize pt's level of independence in the home and community environment.      Pt's spiritual, cultural and educational needs considered and pt agreeable to plan of care and goals.     Anticipated barriers to physical therapy: standing 10-13hrs/day at work, L knee injury with L knee extensor lag     Goals:   Short Term Goals:  4 weeks (progressing, not met)  1.Report decreased    low back    pain  <   / =  4  /10 at its worst to increase tolerance for ADLs (progressing, not met)  2. Pt to increase B Ely's by > or = 10 degrees in order to improve flexibility and posture. (Met)  3. Increased R LE strength by 1/3 muscle grade in all deficient planes to increase tolerance for ADL. (Met for all except hip ext)  4. Increased L LE strength by 1/3 muscle grade in all deficient planes to increase tolerance for ADLs. (Met for all except hip ext)  5. Pt to increase B Ely's Test by > or = 10 degrees in order to improve flexibility and posture. (Met)  6. Pt to tolerate HEP to improve ROM and independence with ADL's (met)  7. Increased lumbar AROM R SB to at least 75% for increased ease with ADLs.  8. Increased lumbar AROM L rotation to at least 75% for increased ease with ADLs.  9. Pt will be independent with modified push pull to achieve level pelvis for increased ease with ADLs. (Met)     Long Term Goals: 6 weeks (progressing, not met)  1.Report decreased    low back    pain  <   / =  2  /10  to increase tolerance for ADLs  2.Pt will ambulate 300+ ft without AD I without significant gait deviations for increased ease with job tasks.   3.Increased R LE strength by 1 muscle grade in all deficient planes to increase tolerance for ADLs.  4. Increased L LE strength by 1 muscle grade in all deficient planes to increase tolerance for ADLs.  6. Pt to be Independent with HEP to improve ROM and independence with ADL's  7. Pt to increase B Ely's Test by > or = 20 degrees in order to improve flexibility  and posture.   8. Pt to demonstrate negative Bridge Test in order to show improved core strength for lumbar stabilization     Plan      Continue per POC, progressing as appropriate to achieve stated goals.       Continue with: Plan of care Certification: 6/20/2023 to 7/21/2023.  Outpatient Physical Therapy 2 times weekly for 8 weeks to include the following interventions: Manual Therapy, Moist Heat/ Ice, Neuromuscular Re-ed, Patient Education, Self Care, Therapeutic Activities, and Therapeutic Exercise, dry needling, electrical stimulation, gait training.     Soha Scott, PTA

## 2023-06-27 ENCOUNTER — DOCUMENTATION ONLY (OUTPATIENT)
Dept: REHABILITATION | Facility: HOSPITAL | Age: 53
End: 2023-06-27
Payer: MEDICAID

## 2023-06-27 ENCOUNTER — CLINICAL SUPPORT (OUTPATIENT)
Dept: REHABILITATION | Facility: HOSPITAL | Age: 53
End: 2023-06-27
Payer: MEDICAID

## 2023-06-27 DIAGNOSIS — R26.89 DECREASED FUNCTIONAL MOBILITY: ICD-10-CM

## 2023-06-27 DIAGNOSIS — R53.1 DECREASED STRENGTH: ICD-10-CM

## 2023-06-27 DIAGNOSIS — G89.29 CHRONIC PAIN OF LEFT KNEE: ICD-10-CM

## 2023-06-27 DIAGNOSIS — R26.9 GAIT ABNORMALITY: Primary | ICD-10-CM

## 2023-06-27 DIAGNOSIS — M25.60 DECREASED RANGE OF MOTION: ICD-10-CM

## 2023-06-27 DIAGNOSIS — M25.562 CHRONIC PAIN OF LEFT KNEE: ICD-10-CM

## 2023-06-27 PROCEDURE — 97110 THERAPEUTIC EXERCISES: CPT | Mod: PN,CQ

## 2023-06-27 NOTE — PROGRESS NOTES
"[facility]   Physical Therapy Treatment Note     Name: Sola Cross  Clinic Number: [id number]    Therapy Diagnosis:   Encounter Diagnoses   Name Primary?    Gait abnormality Yes    Decreased functional mobility     Chronic pain of left knee     Decreased range of motion     Decreased strength          Physician: Kris Cullen,*    Visit Date: 6/27/2023    Physician Orders: PT Eval and Treat   Medical Diagnosis from Referral: Pain in left knee  Evaluation Date: 5/2/2023  Authorization Period Expiration: 3/9/2024  Plan of Care Expiration: 6/30/23  Visit # / Visits authorized: 9/ 12 (+eval)      Precautions: Standard    PTA Visit #: 1/5     Time In: 9:25  Time Out: 10:05  Total Billable Time: 40 minutes    SUBJECTIVE     Pt reports: knee has been doing okay, but the low back has been bothering he more. Waiting on a call from the MD to discuss a surgery date.    She was compliant with home exercise program.  Response to previous treatment: felt okay  Functional change: maybe some improvement of ROM    Pain: 0/10, 6-7/10 at its worst  Location: left knee      OBJECTIVE     L knee ext: -10 deg prior to tx,  -2 deg post exercises    Treatment     Sola received the treatments listed below:        therapeutic exercises to develop strength, ROM, and flexibility for 40 minutes including:    Quad set  x20, 5 sec hold with knee flat in long sit  Heel prop with 5# cuff proximal to knee while performing gastroc stretch with strap x3 min  Bridges with Y sport loop 2 x 10  SLR x20   SL hip abd x20   SL hip add x20   LAQ x 15  Standing TKE with ball into wall 20 x 5 sec   Shuttle press B LE 2.5 bands 2 x 10   NP Goblet squat taps to 18" box + foam x 15  Sidesteps in // bars x 3 laps Y sport loop at ankles   SLS with contralateral heel taps on 6" step, x30s each forward and lateral taps  Pt educated on importance of getting full L knee extension for improved gait pattern and quad activation   " "  May add: knee flex stretch,  balance training      manual therapy techniques: Joint mobilizations were applied to the: L knee for 00 minutes, including:  Patellar mobs as needed in all planes      therapeutic activities to improve functional performance for 00  minutes, including:  May add at later session.        Patient Education and Home Exercises     Home Exercises Provided and Patient Education Provided     Education provided:   - emphasis on quad activation and importance of achieving full knee extension  - importance of daily compliance of HEP  - avoidance of pain with exercises as able    Written Home Exercises Provided: Patient instructed to cont prior HEP. Exercises were reviewed and Sola was able to demonstrate them prior to the end of the session.  Sola demonstrated good  understanding of the education provided. See EMR under Patient Instructions in Notes section for exercises provided during therapy sessions    ASSESSMENT     Patient demonstrating slow improvement of end range knee extension and greater quad activation. Less frequency of reported knee "locking" over last several days. Occasional cues with standing and mat exercises to maintain end range extension when needed. Core and hip strength and stability remain mildly impaired. She will benefit from further hip and LE strengthening to improve tolerance to daily and functional activities as well as prepare for future surgery.    Sola Is progressing well towards her goals.   Pt prognosis is Good.     Pt will continue to benefit from skilled outpatient physical therapy to address the deficits listed in the problem list box on initial evaluation, provide pt/family education and to maximize pt's level of independence in the home and community environment.     Pt's spiritual, cultural and educational needs considered and pt agreeable to plan of care and goals.     Anticipated barriers to physical therapy: none    Goals:   Short Term Goals:  4 " weeks   Pt will report decreased L knee pain to 2/10 in order to increase tolerance for ADLs. (progressing, not met)  Pt will increase L knee extension AROM to 0 degrees in order to show improved functional mobility.(progressing, not met--not consistent)  Pt will increase L knee flexion ROM to at least 135-140 degrees in order to show improved functional mobility. (progressing, not met)  Pt will increase L LE strength by 1/3 muscle grade in all deficient planes for increased ease with ADLs and work activities. (Met in all planes except hip extension)  Pt will be independent and consistent with issued HEP in order to show carryover between therapy sessions.(Met)  Pt will ambulate 300+ ft without AD without L knee extensor lag.     Long Term Goals: 8 weeks (progressing, not met)  Pt will report decreased L knee pain to 1/10 in order to increase tolerance for ADLs.  Pt will increase L knee flexion ROM to 145 degrees in order to show improved functional mobility.  Pt will increase L LE strength by 1 muscle grade in all deficient planes  in order to increase tolerance to functional activities and ADLs.  Pt will be independent with updated HEP to maintain gains following discharge with therapy.  Pt will perform SLS for 30 seconds each for increased ease and safety with ambulating on uneven surfaces. (Met)    PLAN     Continue with POC toward established physical therapy goals.  Progress L knee ROM and strength as tolerated.    Plan of care Certification: 5/2/2023 to 6/30/23.    Outpatient Physical Therapy 2 times weekly for 8 weeks to include the following interventions: Gait Training, Manual Therapy, Moist Heat/ Ice, Neuromuscular Re-ed, Patient Education, Self Care, Therapeutic Activities, Therapeutic Exercise, and dry needling, electrical stimulation.     Aide Wharton, PTA

## 2023-06-27 NOTE — PLAN OF CARE
[facility]   Updated Plan of Care and Physical Therapy Treatment Note       Name: Sola Cross  Clinic Number: [id number]     Therapy Diagnosis:        Encounter Diagnoses   Name Primary?    Chronic bilateral low back pain with right-sided sciatica      Neck pain      Muscle weakness      Gait abnormality      Decreased functional mobility        Physician: Esmer Pedroza PA-C     Visit Date: 4/25/2023     Physician Orders: PT Eval and Treat   Medical Diagnosis from Referral: M54.41,G89.29 (ICD-10-CM) - Chronic bilateral low back pain with right-sided sciatica M54.2 (ICD-10-CM) - Neck pain   Evaluation Date: 2/16/2023  Authorization Period Expiration: 12/31/2023 (Reassessed 5/31/23)  Plan of Care Expiration: 7/21/23  Visit # / Visits authorized: 10/20   FOTO: 1/10      Precautions: Standard, WBAT L LE     Time In: 8:44  (late arrival)  Time Out: 9:15  Total Time: 30 minutes  Total Billable Time: 30 minutes        PTA Visit #: 2/5         Subjective      Pt reports: she barely has any back pain right now.  She states she does not have any pain down her leg.  Pt states she is scheduled for an MRI of her L knee tomorrow and then she will schedule knee surgery.  She states she's been having some dizziness.  She states the MD ruled out vertigo and took some labs.    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), everyday     Pain: 1-2/10, presently  Location:  right LB     Objective       Posture:  R iliac crest elevated, R PSIS more superior -symmetrical today     Posture:  R iliac crest elevated, R PSIS more superior      Wearing right knee support brace     Left knee extension 4/27/2023: -5*     Lumbar ROM: %  Flex: 100  Ext: 100  R SB: 50 with pain  running down lateral leg  L SB: 90  R rot: 50 w/ pain down leg  L rot: 75 pain down leg      Lower Extremity Strength   Right LE   Left LE     Knee extension: 5/5 Knee extension: 3+/5 pain   Knee flexion: 5/5 Knee flexion: 4+/5 pain   Hip flexion: 4+/5 Hip flexion: 4/5   Hip extension:  4+/5 Hip extension: 4/5   Hip abduction: 5/5 Hip abduction: 4+/5   Hip adduction: 5/5 Hip adduction 5/5   Ankle dorsiflexion: 5/5 Ankle dorsiflexion: 5/5         TTP to R glute med     Ely's: + on R at 120 deg, limited L knee ROM      Treatment      Sola received the treatments listed below:       therapeutic exercises to develop strength, ROM, flexibility, and core stabilization for 30 minutes including:    Modified push/pull (L hip flex, R hip ext into mat) 3x3 sec  Piriformis stretch 3x20 sec ea  NP Bridge with Y sport loop for hip abd 2 x 10  Transverse abdominis sets with floating march, yellow sport loop supine x 20 ea-cues for core stabilization to prevent right low back discomfort  Clams, sidelying , yellow sport loop 2x10--stopped due to pain to L IT band  Prone hip ext 2x10 ea  Pallof press Y tubing x10 ea  Sh ext + TA set Y tubing 2x10  Rows + TA set B tubing 2x10    Not performed due to time:  Bent knee fall out, yellow sport loop 2 x 10 ea-left knee tightness afterward  Left hip hike in right side lying x 10-NP  Standing hip extension x 10  Seated HS stretch 3x20 sec ea     Patient Education and Home Exercises        Home Exercises Provided and Patient Education Provided      Education provided:   -perform contract relax of piriformis prior to push pull  -importance of compliance with HEP  -avoid overworking  Pt gave verbal understanding to all education provided      Written Home Exercises Provided: Instructed patient to continue current home exercise program.      Exercises were reviewed and Sola was able to demonstrate them prior to the end of the session.  Sola demonstrated good  understanding of the education  provided. See EMR under Patient Instructions for exercises provided during therapy sessions     Assessment      Pt has not attended PT for low back since 5/31/23.  She continues to improve with function and core strength.  Pt tolerated treatment session well.  She had some pain to L IT band with clams, but resolved once exercise stopped.  She was able to tolerate increased core challenges without exacerbation of pain. She will continue to benefit from skilled intervention to improve her tolerance to work related activities and return to prior level of function.     Sola is progressing towards her goals.      Pt prognosis is Good.      Pt will continue to benefit from skilled outpatient physical therapy to address the deficits listed in the problem list box on initial evaluation, provide pt/family education and to maximize pt's level of independence in the home and community environment.      Pt's spiritual, cultural and educational needs considered and pt agreeable to plan of care and goals.     Anticipated barriers to physical therapy: standing 10-13hrs/day at work, L knee injury with L knee extensor lag     Goals:   Short Term Goals:  4 weeks (progressing, not met)  1.Report decreased    low back    pain  <   / =  4  /10 at its worst to increase tolerance for ADLs (progressing, not met)  2. Pt to increase B Ely's by > or = 10 degrees in order to improve flexibility and posture. (Met)  3. Increased R LE strength by 1/3 muscle grade in all deficient planes to increase tolerance for ADL. (Met for all except hip ext)  4. Increased L LE strength by 1/3 muscle grade in all deficient planes to increase tolerance for ADLs. (Met for all except hip ext)  5. Pt to increase B Ely's Test by > or = 10 degrees in order to improve flexibility and posture. (Met)  6. Pt to tolerate HEP to improve ROM and independence with ADL's (met)  7. Increased lumbar AROM R SB to at least 75% for increased ease with ADLs.  8. Increased lumbar  AROM L rotation to at least 75% for increased ease with ADLs.  9. Pt will be independent with modified push pull to achieve level pelvis for increased ease with ADLs. (Met)     Long Term Goals: 6 weeks (progressing, not met)  1.Report decreased    low back    pain  <   / =  2  /10  to increase tolerance for ADLs  2.Pt will ambulate 300+ ft without AD I without significant gait deviations for increased ease with job tasks.   3.Increased R LE strength by 1 muscle grade in all deficient planes to increase tolerance for ADLs.  4. Increased L LE strength by 1 muscle grade in all deficient planes to increase tolerance for ADLs.  6. Pt to be Independent with HEP to improve ROM and independence with ADL's  7. Pt to increase B Ely's Test by > or = 20 degrees in order to improve flexibility and posture.   8. Pt to demonstrate negative Bridge Test in order to show improved core strength for lumbar stabilization.      Plan      Continue per POC, progressing as appropriate to achieve stated goals.       Continue with: Plan of care Certification: 6/20/2023 to 7/21/2023.  Outpatient Physical Therapy 2 times weekly for 8 weeks to include the following interventions: Manual Therapy, Moist Heat/ Ice, Neuromuscular Re-ed, Patient Education, Self Care, Therapeutic Activities, and Therapeutic Exercise, dry needling, electrical stimulation, gait training.     Ann Marie Jhaveri, PT

## 2023-06-27 NOTE — PROGRESS NOTES
PT/PTA met face to face to discuss pt's treatment plan and progress towards established goals. Pt will be seen by a physical therapist minimally every 6th visit or every 30 days.    Please see Updated Plan of Care dated 5/31/23 for changes and updated goals.    Ann Marie Jhaveri, PT

## 2023-06-30 ENCOUNTER — CLINICAL SUPPORT (OUTPATIENT)
Dept: REHABILITATION | Facility: HOSPITAL | Age: 53
End: 2023-06-30
Payer: MEDICAID

## 2023-06-30 DIAGNOSIS — R26.9 GAIT ABNORMALITY: ICD-10-CM

## 2023-06-30 DIAGNOSIS — M62.81 MUSCLE WEAKNESS: Primary | ICD-10-CM

## 2023-06-30 DIAGNOSIS — R53.1 DECREASED STRENGTH: ICD-10-CM

## 2023-06-30 DIAGNOSIS — G89.29 CHRONIC PAIN OF LEFT KNEE: ICD-10-CM

## 2023-06-30 DIAGNOSIS — M25.60 DECREASED RANGE OF MOTION: ICD-10-CM

## 2023-06-30 DIAGNOSIS — R26.9 GAIT ABNORMALITY: Primary | ICD-10-CM

## 2023-06-30 DIAGNOSIS — M54.41 CHRONIC BILATERAL LOW BACK PAIN WITH RIGHT-SIDED SCIATICA: ICD-10-CM

## 2023-06-30 DIAGNOSIS — G89.29 CHRONIC BILATERAL LOW BACK PAIN WITH RIGHT-SIDED SCIATICA: ICD-10-CM

## 2023-06-30 DIAGNOSIS — R26.89 DECREASED FUNCTIONAL MOBILITY: ICD-10-CM

## 2023-06-30 DIAGNOSIS — M25.562 CHRONIC PAIN OF LEFT KNEE: ICD-10-CM

## 2023-06-30 PROCEDURE — 97110 THERAPEUTIC EXERCISES: CPT | Mod: PN,CQ

## 2023-06-30 NOTE — PROGRESS NOTES
[facility]   Physical Therapy Treatment Note       Name: Sola Cross  Clinic Number: [id number]     Therapy Diagnosis:        Encounter Diagnoses   Name Primary?    Chronic bilateral low back pain with right-sided sciatica      Neck pain      Muscle weakness      Gait abnormality      Decreased functional mobility        Physician: Esmer Pedroza PA-C     Visit Date: 4/25/2023     Physician Orders: PT Eval and Treat   Medical Diagnosis from Referral: M54.41,G89.29 (ICD-10-CM) - Chronic bilateral low back pain with right-sided sciatica M54.2 (ICD-10-CM) - Neck pain   Evaluation Date: 2/16/2023  Authorization Period Expiration: 12/31/2023 (Reassessed 6/20/23)  Plan of Care Expiration: 7/21/23  Visit # / Visits authorized: 13/20   FOTO: 1/10      Precautions: Standard, WBAT L LE     Time In: 0703   Time Out: 0745   Total Time: 42 minutes  Total Billable Time: 42 minutes        PTA Visit #: 2/5         Subjective      Pt reports: 5/10 soreness across her back and lateral right thigh radiating pain that waxes and wanes. Worked on core bracing while standing at work. Woke up a couple of times during the night 2* pain. Did not perform push/push today.     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), everyday     Pain:  5/10, presently  Location:  right LB     Objective       Posture:  R iliac crest elevated, R PSIS more superior -symmetrical today       Treatment      Sola received the treatments listed below:      Manual therapy was applied for 15 minutes after being cleared of contraindications, in the form of:     STM right piriformis, glute med, glute minimal  Muscle energy techniques for correction of right pelvic AR       therapeutic exercises to  develop strength, ROM, flexibility, and core stabilization for 27 minutes including:    Modified push/pull (L hip flex, R hip ext into mat) 3x3 seconds-several times   Piriformis stretch 3x20 sec ea  Bridge with red sport loop for hip abd 2 x 10  Transverse abdominis sets with floating march, yellow sport loop supine x 20 ea-cues for core stabilization to prevent right low back discomfort  Clams, sidelying , yellow sport loop 2x10--stopped due to pain to L IT band  Prone hip ext 2x10 ea-pain, so stopped (NP)  Bent knee fall out, red band 2 x 10 ea-left knee tightness afterward    Not performed due to time:  Left hip hike in right side lying x 10-NP  Standing hip extension x 10  Seated HS stretch 3x20 sec ea  Pallof press Y tubing x10 ea   Sh ext + TA set Y tubing 2x10  Rows + TA set B tubing 2x10     Patient Education and Home Exercises        Home Exercises Provided and Patient Education Provided      Education provided:   -perform contract relax of piriformis prior to push pull  -importance of compliance with HEP  -avoid overworking  Pt gave verbal understanding to all education provided      Written Home Exercises Provided: Instructed patient to continue current home exercise program.      Exercises were reviewed and Sola was able to demonstrate them prior to the end of the session.  Sola demonstrated good  understanding of the education provided. See EMR under Patient Instructions for exercises provided during therapy sessions     Assessment      Increased pain today without known cause, so performed manual therapy and was able to correct pelvic malalignment and decreased radiating symptoms for improved performance and comfort with exercises. Manual cues for increased core activation and maintaining throughout excursions. Increased resistance tolerance with progressions of strengthening and denied pain after session.     She will continue to benefit from skilled intervention to improve her tolerance to work  related activities and return to prior level of function.     Sola is progressing towards her goals.      Pt prognosis is Good.      Pt will continue to benefit from skilled outpatient physical therapy to address the deficits listed in the problem list box on initial evaluation, provide pt/family education and to maximize pt's level of independence in the home and community environment.      Pt's spiritual, cultural and educational needs considered and pt agreeable to plan of care and goals.     Anticipated barriers to physical therapy: standing 10-13hrs/day at work, L knee injury with L knee extensor lag     Goals:   Short Term Goals:  4 weeks (progressing, not met)  1.Report decreased    low back    pain  <   / =  4  /10 at its worst to increase tolerance for ADLs (progressing, not met)  2. Pt to increase B Ely's by > or = 10 degrees in order to improve flexibility and posture. (Met)  3. Increased R LE strength by 1/3 muscle grade in all deficient planes to increase tolerance for ADL. (Met for all except hip ext)  4. Increased L LE strength by 1/3 muscle grade in all deficient planes to increase tolerance for ADLs. (Met for all except hip ext)  5. Pt to increase B Ely's Test by > or = 10 degrees in order to improve flexibility and posture. (Met)  6. Pt to tolerate HEP to improve ROM and independence with ADL's (met)  7. Increased lumbar AROM R SB to at least 75% for increased ease with ADLs.  8. Increased lumbar AROM L rotation to at least 75% for increased ease with ADLs.  9. Pt will be independent with modified push pull to achieve level pelvis for increased ease with ADLs. (Met)     Long Term Goals: 6 weeks (progressing, not met)  1.Report decreased    low back    pain  <   / =  2  /10  to increase tolerance for ADLs  2.Pt will ambulate 300+ ft without AD I without significant gait deviations for increased ease with job tasks.   3.Increased R LE strength by 1 muscle grade in all deficient planes to increase  tolerance for ADLs.  4. Increased L LE strength by 1 muscle grade in all deficient planes to increase tolerance for ADLs.  6. Pt to be Independent with HEP to improve ROM and independence with ADL's  7. Pt to increase B Ely's Test by > or = 20 degrees in order to improve flexibility and posture.   8. Pt to demonstrate negative Bridge Test in order to show improved core strength for lumbar stabilization     Plan      Continue per POC, progressing as appropriate to achieve stated goals.       Continue with: Plan of care Certification: 6/20/2023 to 7/21/2023.  Outpatient Physical Therapy 2 times weekly for 8 weeks to include the following interventions: Manual Therapy, Moist Heat/ Ice, Neuromuscular Re-ed, Patient Education, Self Care, Therapeutic Activities, and Therapeutic Exercise, dry needling, electrical stimulation, gait training.     Soha Scott, PTA

## 2023-06-30 NOTE — PROGRESS NOTES
"[facility]   Physical Therapy Treatment Note     Name: Sola Cross  Clinic Number: [id number]    Therapy Diagnosis:   Encounter Diagnoses   Name Primary?    Gait abnormality Yes    Decreased functional mobility     Chronic pain of left knee     Decreased range of motion     Decreased strength          Physician: Kris Cullen,*    Visit Date: 6/30/2023    Physician Orders: PT Eval and Treat   Medical Diagnosis from Referral: Pain in left knee  Evaluation Date: 5/2/2023  Authorization Period Expiration: 3/9/2024  Plan of Care Expiration: 6/30/23  Visit # / Visits authorized: 10/ 12 (+eval)      Precautions: Standard    PTA Visit #: 1/5     Time In: 7:45  Time Out: 8:28  Total Billable Time: 43 minutes    SUBJECTIVE     Pt reports: she has a very long day ahead of her at work. Knee is doing okay, some days pops more than others, but did not ever get stuck. July 19 is the surgery date for her knee.     She was compliant with home exercise program.  Response to previous treatment: felt fine  Functional change: maybe some improvement of ROM    Pain: 0/10, 6-7/10 at its worst  Location: left knee      OBJECTIVE     L knee ext: -10 deg prior to tx,  -2 deg post exercises    Treatment     Sola received the treatments listed below:        therapeutic exercises to develop strength, ROM, and flexibility for 43 minutes including:    Quad set  x20, 5 sec hold with knee flat in long sit  Heel prop with 5# cuff proximal to knee while performing gastroc stretch with strap x3 min  Bridges with Y sport loop 2 x 10 (NP- did these with lumbar PT)  SLR x20   SL hip abd x20   SL hip add x20   LAQ x 15  Standing TKE with ball into wall 20 x 5 sec   Shuttle press B LE 2.5 bands 3 x 10   Goblet squat taps to 18" box + foam x 15  Sidesteps in // bars x 3 laps Y sport loop at ankles   march on airex foam with contralateral heel taps on 8" step, 2x30s   Pt educated on importance of getting full L knee " extension for improved gait pattern and quad activation     May add: knee flex stretch,  balance training      manual therapy techniques: Joint mobilizations were applied to the: L knee for 00 minutes, including:  Patellar mobs as needed in all planes      therapeutic activities to improve functional performance for 00  minutes, including:  May add at later session.        Patient Education and Home Exercises     Home Exercises Provided and Patient Education Provided     Education provided:   - emphasis on quad activation and importance of achieving full knee extension  - importance of daily compliance of HEP  - avoidance of pain with exercises as able    Written Home Exercises Provided: Patient instructed to cont prior HEP. Exercises were reviewed and Sola was able to demonstrate them prior to the end of the session.  Sola demonstrated good  understanding of the education provided. See EMR under Patient Instructions in Notes section for exercises provided during therapy sessions    ASSESSMENT     Patient tolerated progression of exercises well without onset of pain to the left knee. Able to resume goblet squats with improving control and maintaining pain free ROM. Very slow improvements of end ranges knee extension, but good quad activation present through current range.  She will benefit from further hip and LE strengthening to improve tolerance to daily and functional activities as well as prepare for future surgery.    Sola Is progressing well towards her goals.   Pt prognosis is Good.     Pt will continue to benefit from skilled outpatient physical therapy to address the deficits listed in the problem list box on initial evaluation, provide pt/family education and to maximize pt's level of independence in the home and community environment.     Pt's spiritual, cultural and educational needs considered and pt agreeable to plan of care and goals.     Anticipated barriers to physical therapy: none    Goals:    Short Term Goals:  4 weeks   Pt will report decreased L knee pain to 2/10 in order to increase tolerance for ADLs. (progressing, not met)  Pt will increase L knee extension AROM to 0 degrees in order to show improved functional mobility.(progressing, not met--not consistent)  Pt will increase L knee flexion ROM to at least 135-140 degrees in order to show improved functional mobility. (progressing, not met)  Pt will increase L LE strength by 1/3 muscle grade in all deficient planes for increased ease with ADLs and work activities. (Met in all planes except hip extension)  Pt will be independent and consistent with issued HEP in order to show carryover between therapy sessions.(Met)  Pt will ambulate 300+ ft without AD without L knee extensor lag.     Long Term Goals: 8 weeks (progressing, not met)  Pt will report decreased L knee pain to 1/10 in order to increase tolerance for ADLs.  Pt will increase L knee flexion ROM to 145 degrees in order to show improved functional mobility.  Pt will increase L LE strength by 1 muscle grade in all deficient planes  in order to increase tolerance to functional activities and ADLs.  Pt will be independent with updated HEP to maintain gains following discharge with therapy.  Pt will perform SLS for 30 seconds each for increased ease and safety with ambulating on uneven surfaces. (Met)    PLAN     Continue with POC toward established physical therapy goals.  Progress L knee ROM and strength as tolerated.    Plan of care Certification: 5/2/2023 to 6/30/23.    Outpatient Physical Therapy 2 times weekly for 8 weeks to include the following interventions: Gait Training, Manual Therapy, Moist Heat/ Ice, Neuromuscular Re-ed, Patient Education, Self Care, Therapeutic Activities, Therapeutic Exercise, and dry needling, electrical stimulation.     Aide Wharton, PTA

## 2023-07-03 ENCOUNTER — DOCUMENTATION ONLY (OUTPATIENT)
Dept: REHABILITATION | Facility: HOSPITAL | Age: 53
End: 2023-07-03
Payer: MEDICAID

## 2023-07-03 NOTE — PROGRESS NOTES
Outpatient Therapy Compliance Update     Name: Sola Cross  Clinic Number: [id number]    Therapy Diagnosis:   Encounter Diagnoses   Name Primary?    Gait abnormality Yes    Decreased functional mobility      Chronic pain of left knee      Decreased range of motion      Decreased strength        Physician: Kris Cullen,*    Care Update      Today Sola Cross no-showed to his/her Physical Therapy appointment.   Sola Cross was contacted regarding [missed appointment and left a voice mail.    Date of next scheduled appointment: 7/7/23    Ann Marie Jhaveri PT       Physician's comments:           Physician's Signature: ___________________________________________________

## 2023-07-07 ENCOUNTER — CLINICAL SUPPORT (OUTPATIENT)
Dept: REHABILITATION | Facility: HOSPITAL | Age: 53
End: 2023-07-07
Payer: MEDICAID

## 2023-07-07 DIAGNOSIS — G89.29 CHRONIC BILATERAL LOW BACK PAIN WITH RIGHT-SIDED SCIATICA: ICD-10-CM

## 2023-07-07 DIAGNOSIS — R26.9 GAIT ABNORMALITY: Primary | ICD-10-CM

## 2023-07-07 DIAGNOSIS — M25.562 CHRONIC PAIN OF LEFT KNEE: ICD-10-CM

## 2023-07-07 DIAGNOSIS — G89.29 CHRONIC PAIN OF LEFT KNEE: ICD-10-CM

## 2023-07-07 DIAGNOSIS — M62.81 MUSCLE WEAKNESS: Primary | ICD-10-CM

## 2023-07-07 DIAGNOSIS — R26.89 DECREASED FUNCTIONAL MOBILITY: ICD-10-CM

## 2023-07-07 DIAGNOSIS — R53.1 DECREASED STRENGTH: ICD-10-CM

## 2023-07-07 DIAGNOSIS — M25.60 DECREASED RANGE OF MOTION: ICD-10-CM

## 2023-07-07 DIAGNOSIS — M54.41 CHRONIC BILATERAL LOW BACK PAIN WITH RIGHT-SIDED SCIATICA: ICD-10-CM

## 2023-07-07 DIAGNOSIS — R26.9 GAIT ABNORMALITY: ICD-10-CM

## 2023-07-07 PROCEDURE — 97110 THERAPEUTIC EXERCISES: CPT | Mod: PN,CQ

## 2023-07-07 NOTE — PROGRESS NOTES
"[facility]   Physical Therapy Treatment Note     Name: Sola Cross  Clinic Number: [id number]    Therapy Diagnosis:   Encounter Diagnoses   Name Primary?    Gait abnormality Yes    Decreased functional mobility     Chronic pain of left knee     Decreased range of motion     Decreased strength          Physician: Kris Cullen,*    Visit Date: 7/7/2023    Physician Orders: PT Eval and Treat   Medical Diagnosis from Referral: Pain in left knee  Evaluation Date: 5/2/2023  Authorization Period Expiration: 3/9/2024  Plan of Care Expiration: 6/30/23  Visit # / Visits authorized: 11/ 12 (+eval)      Precautions: Standard    PTA Visit #: 2/5     Time In: 10:03  Time Out: 10:45  Total Billable Time: 43 minutes    SUBJECTIVE     Pt reports: the knee is not doing well today, had a bad day yesterday and having some trouble walking today. Is occasionally buckling in the last couple days. Pain has been along the posterior knee and into the anterior shin. States today may be her last physical therapy session until she has surgery.     She was compliant with home exercise program.  Response to previous treatment: felt okay  Functional change: maybe some improvement of ROM    Pain: 0/10, 6-7/10 at its worst  Location: left knee      OBJECTIVE     L knee ext: -4 deg prior to tx,  -2 deg post exercises    Treatment     Sola received the treatments listed below:        therapeutic exercises to develop strength, ROM, and flexibility for 43 minutes including:    Quad set  x20, 5 sec hold with knee flat in long sit  Heel prop with 5# cuff proximal to knee while performing gastroc stretch with strap x3 min  Bridges with Y sport loop 2 x 10 (NP- did these with lumbar PT)  SLR x20   SL hip abd x20   SL hip add x20   LAQ x 20  Standing TKE with ball into wall 20 x 5 sec   Goblet squat taps to 18" box x 15  Sidesteps in // bars x 3 laps Y sport loop at ankles   march on airex foam with contralateral heel " "taps on 8" step, x 1 min  Shuttle press B LE 2.5 bands 3x10  Pt educated on importance of getting full L knee extension for improved gait pattern and quad activation     May add: knee flex stretch,  balance training      manual therapy techniques: Joint mobilizations were applied to the: L knee for 00 minutes, including:  Patellar mobs as needed in all planes      therapeutic activities to improve functional performance for 00  minutes, including:  May add at later session.        Patient Education and Home Exercises     Home Exercises Provided and Patient Education Provided     Education provided:   - emphasis on quad activation and importance of achieving full knee extension  - importance of daily compliance of HEP  - avoidance of pain with exercises as able    Written Home Exercises Provided: Patient instructed to cont prior HEP. Exercises were reviewed and Sola was able to demonstrate them prior to the end of the session.  Sola demonstrated good  understanding of the education provided. See EMR under Patient Instructions in Notes section for exercises provided during therapy sessions    ASSESSMENT     Patient tolerated completed exercises well this date with improved knee extension as compared to previous session. Good knee stability observed throughout treatment although patient did report some unsteadiness with gait within the clinic. Able to perform goblet squats without foam this date with no pain. Per patient, she is likely to hold off on continuing therapy and will continue with HEP until having surgery in 2 weeks.     Sola Is progressing well towards her goals.   Pt prognosis is Good.       Pt's spiritual, cultural and educational needs considered and pt agreeable to plan of care and goals.     Anticipated barriers to physical therapy: none    Goals:   Short Term Goals:  4 weeks   Pt will report decreased L knee pain to 2/10 in order to increase tolerance for ADLs. (progressing, not met)  Pt will " increase L knee extension AROM to 0 degrees in order to show improved functional mobility.(progressing, not met--not consistent)  Pt will increase L knee flexion ROM to at least 135-140 degrees in order to show improved functional mobility. (progressing, not met)  Pt will increase L LE strength by 1/3 muscle grade in all deficient planes for increased ease with ADLs and work activities. (Met in all planes except hip extension)  Pt will be independent and consistent with issued HEP in order to show carryover between therapy sessions.(Met)  Pt will ambulate 300+ ft without AD without L knee extensor lag.     Long Term Goals: 8 weeks (progressing, not met)  Pt will report decreased L knee pain to 1/10 in order to increase tolerance for ADLs.  Pt will increase L knee flexion ROM to 145 degrees in order to show improved functional mobility.  Pt will increase L LE strength by 1 muscle grade in all deficient planes  in order to increase tolerance to functional activities and ADLs.  Pt will be independent with updated HEP to maintain gains following discharge with therapy.  Pt will perform SLS for 30 seconds each for increased ease and safety with ambulating on uneven surfaces. (Met)    PLAN     Per patient, she will continue at home with HEP but hold off on therapy for right now. Patient to call if anything changes between now and surgery on 7/19/23.    Plan of care Certification: 5/2/2023 to 6/30/23.    Outpatient Physical Therapy 2 times weekly for 8 weeks to include the following interventions: Gait Training, Manual Therapy, Moist Heat/ Ice, Neuromuscular Re-ed, Patient Education, Self Care, Therapeutic Activities, Therapeutic Exercise, and dry needling, electrical stimulation.     Aide Wharton, PTA

## 2023-07-07 NOTE — PROGRESS NOTES
[facility]   Physical Therapy Treatment Note       Name: Sola Cross  Clinic Number: [id number]     Therapy Diagnosis:        Encounter Diagnoses   Name Primary?    Chronic bilateral low back pain with right-sided sciatica      Neck pain      Muscle weakness      Gait abnormality      Decreased functional mobility        Physician: Esmer Pedroza PA-C     Visit Date: 4/25/2023     Physician Orders: PT Eval and Treat   Medical Diagnosis from Referral: M54.41,G89.29 (ICD-10-CM) - Chronic bilateral low back pain with right-sided sciatica M54.2 (ICD-10-CM) - Neck pain   Evaluation Date: 2/16/2023  Authorization Period Expiration: 12/31/2023 (Reassessed 6/20/23)  Plan of Care Expiration: 7/21/23  Visit # / Visits authorized: 14/20   FOTO: 1/10      Precautions: Standard, WBAT L LE     Time In: 0925   Time Out: 1002   Total Time: 37 minutes  Total Billable Time: 37 minutes        PTA Visit #: 3/5         Subjective      Pt reports: missed last appt 2* higher level of anxiety and sees MD for it. Used bracing techniques with work while lifting boxes and chores around the house. Stated at the end of session that this is likely her last session 2* having knee surgery soon and too busy with work to be able to come back.     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), everyday     Pain:  010, presently  Location:  right LB     Objective       Posture:  R iliac crest elevated, R PSIS more superior -symmetrical today    Treatment      Sola received the treatments listed below:      Manual therapy was applied for 00 minutes after being cleared of contraindications, in the form of:     STM right piriformis, glute med, glute minimal  Muscle energy techniques for  correction of right pelvic AR       therapeutic exercises to develop strength, ROM, flexibility, and core stabilization for 37 minutes including:    Modified push/pull (L hip flex, R hip ext into mat) 3x3 seconds-several times   Piriformis stretch 3x20 sec ea  Bridge with red sport loop for hip abd 2 x 10x 20 ea-cues for core stabilization to prevent right low back discomfort  Clams, sidelying , red sport loop 2x10--stopped due to pain to L IT band      Standing hip extension x 10  Seated HS stretch 3x20 sec ea  Pallof press Y tubing x10 ea   Sh ext + TA set Y tubing 2x10  Rows + TA set B tubing 2x10      Not performed this date:  Left hip hike in right side lying x 10-NP  Prone hip ext 2x10 ea-pain, so stopped (NP)  Bent knee fall out, red band 2 x 10 ea-left knee tightness afterward  Transverse abdominis sets with floating march, yellow sport loop supine      Patient Education and Home Exercises        Home Exercises Provided and Patient Education Provided      Education provided:   -perform contract relax of piriformis prior to push pull  -importance of compliance with HEP  -avoid overworking  Pt gave verbal understanding to all education provided      Written Home Exercises Provided: consulted with PAUL Noble and issued consolidated home exercise program for improved clarification and ability to perform more regularly.       Exercises were reviewed and Sola was able to demonstrate them prior to the end of the session.  Sola demonstrated good  understanding of the education provided. See EMR under Patient Instructions for exercises provided during therapy sessions     Assessment      Improved back pain and able to progress her strengthening today without pain provocation. Occasional cues for stacking hips with side lying exercises and increased core activation verbal cues resting on iliofemoral ligaments with standing exercises, but improving form with all exercises and good corrections.     She will continue  to benefit from skilled intervention to improve her tolerance to work related activities and return to prior level of function.     Sola is progressing towards her goals.      Pt prognosis is Good.      Pt will continue to benefit from skilled outpatient physical therapy to address the deficits listed in the problem list box on initial evaluation, provide pt/family education and to maximize pt's level of independence in the home and community environment.      Pt's spiritual, cultural and educational needs considered and pt agreeable to plan of care and goals.     Anticipated barriers to physical therapy: standing 10-13hrs/day at work, L knee injury with L knee extensor lag     Goals:   Short Term Goals:  4 weeks (progressing, not met)  1.Report decreased    low back    pain  <   / =  4  /10 at its worst to increase tolerance for ADLs (progressing, not met)  2. Pt to increase B Ely's by > or = 10 degrees in order to improve flexibility and posture. (Met)  3. Increased R LE strength by 1/3 muscle grade in all deficient planes to increase tolerance for ADL. (Met for all except hip ext)  4. Increased L LE strength by 1/3 muscle grade in all deficient planes to increase tolerance for ADLs. (Met for all except hip ext)  5. Pt to increase B Ely's Test by > or = 10 degrees in order to improve flexibility and posture. (Met)  6. Pt to tolerate HEP to improve ROM and independence with ADL's (met)  7. Increased lumbar AROM R SB to at least 75% for increased ease with ADLs.  8. Increased lumbar AROM L rotation to at least 75% for increased ease with ADLs.  9. Pt will be independent with modified push pull to achieve level pelvis for increased ease with ADLs. (Met)     Long Term Goals: 6 weeks (progressing, not met)  1.Report decreased    low back    pain  <   / =  2  /10  to increase tolerance for ADLs  2.Pt will ambulate 300+ ft without AD I without significant gait deviations for increased ease with job tasks.   3.Increased  R LE strength by 1 muscle grade in all deficient planes to increase tolerance for ADLs.  4. Increased L LE strength by 1 muscle grade in all deficient planes to increase tolerance for ADLs.  6. Pt to be Independent with HEP to improve ROM and independence with ADL's  7. Pt to increase B Ely's Test by > or = 20 degrees in order to improve flexibility and posture.   8. Pt to demonstrate negative Bridge Test in order to show improved core strength for lumbar stabilization     Plan      Continue per POC, progressing as appropriate to achieve stated goals.       Continue with: Plan of care Certification: 6/20/2023 to 7/21/2023.  Outpatient Physical Therapy 2 times weekly for 8 weeks to include the following interventions: Manual Therapy, Moist Heat/ Ice, Neuromuscular Re-ed, Patient Education, Self Care, Therapeutic Activities, and Therapeutic Exercise, dry needling, electrical stimulation, gait training.     Soha Scott, PTA

## 2023-08-07 ENCOUNTER — PROCEDURE VISIT (OUTPATIENT)
Dept: NEUROLOGY | Facility: CLINIC | Age: 53
End: 2023-08-07
Payer: MEDICAID

## 2023-08-07 VITALS — RESPIRATION RATE: 16 BRPM | DIASTOLIC BLOOD PRESSURE: 76 MMHG | SYSTOLIC BLOOD PRESSURE: 177 MMHG | HEART RATE: 85 BPM

## 2023-08-07 DIAGNOSIS — G43.719 INTRACTABLE CHRONIC MIGRAINE WITHOUT AURA AND WITHOUT STATUS MIGRAINOSUS: Primary | ICD-10-CM

## 2023-08-07 PROCEDURE — 64615 CHEMODENERV MUSC MIGRAINE: CPT | Mod: PBBFAC,PO | Performed by: NURSE PRACTITIONER

## 2023-08-07 PROCEDURE — 64615 PR CHEMODENERVATION OF MUSCLE FOR CHRONIC MIGRAINE: ICD-10-PCS | Mod: S$PBB,,, | Performed by: NURSE PRACTITIONER

## 2023-08-07 PROCEDURE — 64615 CHEMODENERV MUSC MIGRAINE: CPT | Mod: S$PBB,,, | Performed by: NURSE PRACTITIONER

## 2023-08-07 PROCEDURE — 99999PBSHW PR PBB SHADOW TECHNICAL ONLY FILED TO HB: Mod: JW,PBBFAC,,

## 2023-08-07 PROCEDURE — 99999PBSHW PR PBB SHADOW TECHNICAL ONLY FILED TO HB: ICD-10-PCS | Mod: JW,PBBFAC,,

## 2023-08-07 RX ORDER — SENNOSIDES 8.6 MG
2 TABLET ORAL
COMMUNITY
Start: 2023-07-19 | End: 2023-09-05

## 2023-08-07 RX ORDER — OXYCODONE HYDROCHLORIDE 5 MG/1
5 TABLET ORAL EVERY 4 HOURS PRN
COMMUNITY
Start: 2023-07-19 | End: 2023-09-05

## 2023-08-07 RX ORDER — ONDANSETRON HYDROCHLORIDE 8 MG/1
8 TABLET, FILM COATED ORAL 2 TIMES DAILY
COMMUNITY
Start: 2023-07-19

## 2023-08-07 RX ADMIN — ONABOTULINUMTOXINA 200 UNITS: 100 INJECTION, POWDER, LYOPHILIZED, FOR SOLUTION INTRADERMAL; INTRAMUSCULAR at 03:08

## 2023-08-07 NOTE — PROCEDURES
Procedures    A time out was conducted just before the start of the procedure to verify the correct patient and procedure, procedure location, and all relevant critical information.      Conventional methods of treatment such as multiple medications, both on and   off label have been tried including: Lexapro, trazodone, sertraline, Unable to do antihypertensive ssecondary to low blood pressuer at baseline. Unable to do Depakote due to weight and currently using ozempic for weight loss  Unable to do topiramate secondary to ADHD   Unable to use TCA due to trazodone use ofr sleep and weight.      The patient has been unresponsive and refractory.The patient meets criteria for chronic headaches according to the ICHD-II, the patient has more than 15 headaches a month which last for more than 4 hours a day.     Botox session number: 5  Last session was 12 weeks ago and resulted in improvement of: 75%     I am aiming for at least 50%  improvement in the patient's symptoms. Frequency of treatment is every 3 months unless no response to the treatments, at which time we will discontinue the injections.      DESCRIPTION OF PROCEDURE: After obtaining informed consent and under   aseptic technique, a total of 125 units of botulinum toxin type A were   injected in the following muscles:      -- Procerus 5 units  --  5 units bilaterally  -- Frontalis 20 units  -- Temporalis 20 units bilaterally  -- Occipitalis 15 units bilaterally  -- Upper cervical paraspinals 10 units bilaterally  -- SPARED Trapezius 15 units bilaterally. (Due to weakness)     The patient tolerated the procedure well. There were no complications. The patient was given a prescription for repeat treatment in 12 weeks      Unavoidable waste 75 units    GUANAKITO Barfield

## 2023-08-09 DIAGNOSIS — M25.562 LEFT KNEE PAIN: Primary | ICD-10-CM

## 2023-09-01 DIAGNOSIS — M25.562 PAIN IN LEFT KNEE: Primary | ICD-10-CM

## 2023-09-13 ENCOUNTER — CLINICAL SUPPORT (OUTPATIENT)
Dept: REHABILITATION | Facility: HOSPITAL | Age: 53
End: 2023-09-13
Payer: MEDICAID

## 2023-09-13 ENCOUNTER — DOCUMENTATION ONLY (OUTPATIENT)
Dept: REHABILITATION | Facility: HOSPITAL | Age: 53
End: 2023-09-13

## 2023-09-13 DIAGNOSIS — M62.81 MUSCLE WEAKNESS: Primary | ICD-10-CM

## 2023-09-13 DIAGNOSIS — M25.60 DECREASED RANGE OF MOTION: ICD-10-CM

## 2023-09-13 DIAGNOSIS — R26.9 GAIT ABNORMALITY: ICD-10-CM

## 2023-09-13 DIAGNOSIS — R26.89 DECREASED FUNCTIONAL MOBILITY: ICD-10-CM

## 2023-09-13 DIAGNOSIS — R53.1 DECREASED STRENGTH: ICD-10-CM

## 2023-09-13 PROCEDURE — 97110 THERAPEUTIC EXERCISES: CPT | Mod: PN

## 2023-09-13 PROCEDURE — 97161 PT EVAL LOW COMPLEX 20 MIN: CPT | Mod: PN

## 2023-09-13 NOTE — PROGRESS NOTES
[facility]  Physical Therapy Discharge Note    Name: Sola Cross  Clinic Number: [id number]    Therapy Diagnosis:    Gait abnormality Yes    Decreased functional mobility      Chronic pain of left knee      Decreased range of motion      Decreased strength      Physician: Kris Cullen,*    Physician Orders: PT Eval and Treat   Medical Diagnosis: Pain in left knee  Evaluation Date: 5/2/2023      Date of Last visit: 7/7/2023  Total Visits Received: 12    ASSESSMENT      Pt discontinued PT due to having surgery    Discharge reason: Other:  pt discharged to have knee surgery    Discharge FOTO Score: NA    Goals:   Short Term Goals:  4 weeks   Pt will report decreased L knee pain to 2/10 in order to increase tolerance for ADLs. (progressing, not met)  Pt will increase L knee extension AROM to 0 degrees in order to show improved functional mobility.(progressing, not met--not consistent)  Pt will increase L knee flexion ROM to at least 135-140 degrees in order to show improved functional mobility. (progressing, not met)  Pt will increase L LE strength by 1/3 muscle grade in all deficient planes for increased ease with ADLs and work activities. (Met in all planes except hip extension)  Pt will be independent and consistent with issued HEP in order to show carryover between therapy sessions.(Met)  Pt will ambulate 300+ ft without AD without L knee extensor lag.     Long Term Goals: 8 weeks (progressing, not met)  Pt will report decreased L knee pain to 1/10 in order to increase tolerance for ADLs.  Pt will increase L knee flexion ROM to 145 degrees in order to show improved functional mobility.  Pt will increase L LE strength by 1 muscle grade in all deficient planes  in order to increase tolerance to functional activities and ADLs.  Pt will be independent with updated HEP to maintain gains following discharge with therapy.  Pt will perform SLS for 30 seconds each for increased ease  and safety with ambulating on uneven surfaces. (Met)    PLAN   This patient is discharged from Physical Therapy      Ann Marie Jhaveri PT

## 2023-09-13 NOTE — PLAN OF CARE
[facility]  Physical Therapy Initial Evaluation    Name: Sola Cross  Clinic Number: [id number]    Therapy Diagnosis:   Encounter Diagnoses   Name Primary?    Muscle weakness Yes    Gait abnormality     Decreased functional mobility     Decreased range of motion     Decreased strength      Physician: Esmer Pedroza PA-C    Physician Orders: PT Eval and Treat (follow lateral meniscus repair protocol)  Medical Diagnosis from Referral: Pain in left knee  Evaluation Date: 9/13/2023  Authorization Period Expiration: 8/31/24  Plan of Care Expiration: 11/10/23  Visit # / Visits authorized: 1/ 1    Time In: 10:55  Time Out: 11:35  Total Billable Time: 40 minutes    Precautions: Standard    DOS: 7/19/23  Arthroscopy Knee, LEFT KNEE SCOPE WITH LATERAL REPAIR  Lateral meniscal repair  Partial medial menisectomy  Extensive debridement of 3 more compartments  Debridement of medial plica  Decompression of Baker's cyst    Subjective   Date of onset: 7/19/23  History of current condition - Sola reports: she had sx on 7/19/23.  The skin started growing over the stitches and got infected.  She states she had cellulitis.  That is getting better.  She states she was walking with her knee locked out because it hurt when it was infected.  When she tried to bend knee it was stiff.  She states she ices L knee daily due to pain.      She states she's been doing quad sets, SLR, mini squats     Medical History:   Past Medical History:   Diagnosis Date    Adjustment disorder with anxiety 08/06/2015    Anemia 08/06/2015    Attention deficit disorder with hyperactivity(314.01) 08/06/2015    Bronchitis with flu     Complete tear of right rotator cuff 12/27/2016    Depression     General anesthetics causing adverse effect in therapeutic use     Headache     Hypotension, iatrogenic     TMJ (dislocation of temporomandibular joint)        Surgical History:   Sola Cross  has a past surgical history that includes  Tonsillectomy; Adenoidectomy; Gastric bypass; Hysterectomy; Colonoscopy; Rotator cuff repair (Right); Shoulder arthroscopy (Right, 12/27/2016); and Breast biopsy.    Medications:   Sola has a current medication list which includes the following prescription(s): ascorbic acid (vitamin c), cholecalciferol (vitamin d3), cyanocobalamin (vitamin b-12), dextroamphetamine-amphetamine, dextroamphetamine-amphetamine, dextroamphetamine-amphetamine, dextroamphetamine-amphetamine, dextroamphetamine-amphetamine, dextroamphetamine-amphetamine, diclofenac sodium, escitalopram oxalate, ferrous sulfate, fluticasone propionate, hydrocodone-acetaminophen, hydroxyzine hcl, multivitamin, mupirocin, naproxen sodium, ondansetron, ondansetron, phenazopyridine, rizatriptan, spironolactone, sumatriptan, tizanidine, tramadol, trazodone, UNABLE TO FIND, and UNABLE TO FIND, and the following Facility-Administered Medications: onabotulinumtoxina, onabotulinumtoxina, and onabotulinumtoxina.    Allergies:   Review of patient's allergies indicates:   Allergen Reactions    Demerol [meperidine] Itching    Niacin preparations Other (See Comments)     RED MAN SYNDRONE    Penicillins Swelling     OF THROAT    Sudafed [pseudoephedrine hcl] Palpitations    Wellbutrin [bupropion hcl] Photosensitivity, Nausea Only, Anxiety, Palpitations and Other (See Comments)     Leg numbness, sorrow, unexplained crying        Imaging, none: in Epic of L knee since sx on 7/19/23    Prior Therapy: PT for knee and low back  Social History: pt lives alone, 1 curb step to enter  Occupation: working for [employer]  Prior Level of Function: pain and difficulty with ambulation prior to sx  Current Level of Function: has to sit and take breaks due to pain and edema with standing    Pain:  Current 3/10, worst 8/10, best 3/10   Location: left knee  Description: Aching, Dull, and Throbbing  Aggravating Factors: standing, walking, stairs, sit to stand  Easing  Factors: ice, Naproxen, Tramadol    Pts goals: decreased L knee pain, be able to walk without pain      Objective     Observation: pt is pleasant and cooperative    Posture: L knee valgus      Range of Motion: (deg)  Knee Left active Right Active   Flexion 117 151   Extension -2 0       Lower Extremity Strength  Left LE  Right LE    Knee extension: 4-/5 Knee extension: 5/5   Knee flexion: 3+/5 Knee flexion: 5/5   Hip flexion: 4-/5 Hip flexion: 5/5   Hip extension:  4+/5 Hip extension: 4/5 pain to low back   Hip abduction: 4/5 Hip abduction: 4+/5   Hip adduction: 4+/5 Hip adduction 5/5   Ankle dorsiflexion: 5/5 Ankle dorsiflexion: 5/5       Function:  - Squat: able to perform mini squat   - Sit <--> Stand:able to perform with UE support and sliding L foot fwd for decreased L LE weight bearing   - Bed Mobility: I with all aspects    Joint Mobility: B  Patellar mobility WNL in all planes    Palpation: TTP to L knee incision sites  Pt presents with bandages over 2 incision sites.  Some redness for skin irritation secondary to adhesive allergy from previous bandage.    Sensation: grossly intact to light touch    Edema: none noted to L knee today, but pt reports edema to L knee and lower leg by end of her work day      Intake Outcome Measure for FOTO knee Survey  Therapist reviewed FOTO scores for Sola Cross on 9/13/2023.  FOTO report- see Media section or FOTO account episode details.  Intake Score : 40%     PT Evaluation Completed? Yes  Discussed Plan of Care with patient: Yes      TREATMENT   Treatment Time In: 11:20  Treatment Time Out: 11:35  Total Treatment time separate from Evaluation: 15 minutes    Sola received therapeutic exercises to develop strength, endurance, ROM, and flexibility for 15 minutes including:  Quad sets in long sit x10, 5 sec hold  Heel prop for knee ext with 5# cuff proximal to knee, gastroc stretch with strap  Bridges x10, 5 sec hold  SLR x10  SL hip abd x10  SL hip add x10  Knee  flexion stretch with strap    May add: sit to stand, shuttle, mini squats, fwd step ups, lateral step downs    Home Exercises and Patient Education Provided    Education provided:   - role of PT  -importance of compliance with HEP  -may use compression socks to minimize L knee and LE edema at work  Pt gave verbal understanding to all education provided     Written Home Exercises Provided: yes.  Exercises were reviewed and Sola was able to demonstrate them prior to the end of the session.  Sola demonstrated good  understanding of the education provided.     See EMR under Patient Instructions for exercises provided 9/13/23.    Assessment   Sola is a 53 y.o. female referred to outpatient Physical Therapy with a medical diagnosis of Pain in left knee (L knee scope and lateral meniscus repair). Pt presents with L knee pain, decreased L knee ROM, decreased LE strength, L knee extension lag with ambulation.  She will benefit from PT for progressive L knee ROM and LE strengthening and gait training as tolerated.    Pt prognosis is Good.   Pt will benefit from skilled outpatient Physical Therapy to address the deficits stated above and in the chart below, provide pt/family education, and to maximize pt's level of independence.     Plan of care discussed with patient: Yes  Pt's spiritual, cultural and educational needs considered and patient is agreeable to the plan of care and goals as stated below:     Anticipated Barriers for therapy: none    Medical Necessity is demonstrated by the following  History  Co-morbidities and personal factors that may impact the plan of care [] LOW: no personal factors / co-morbidities  [x] MODERATE: 1-2 personal factors / co-morbidities  [] HIGH: 3+ personal factors / co-morbidities    Moderate / High Support Documentation:   Co-morbidities affecting plan of care: depression    Personal Factors:   lifestyle     Examination  Body Structures and Functions, activity limitations and  participation restrictions that may impact the plan of care [] LOW: addressing 1-2 elements  [] MODERATE: 3+ elements  [x] HIGH: 4+ elements (please support below)    Moderate / High Support Documentation: decreased knee ROM, decreased LE strength, difficulty with transfers, ambulation and daily chores     Clinical Presentation [x] LOW: stable  [] MODERATE: Evolving  [] HIGH: Unstable     Decision Making/ Complexity Score: low       GOALS:   Short Term Goals:  4 weeks (progressing, not met)  Pt will report decreased L knee pain to 4/10 at its worst in order to increase tolerance for ADLs.  Pt will increase R knee extension ROM to at least 0 degrees in order to show improved functional mobility.  Pt will increase L knee flexion ROM to at least 135 degrees in order to show improved functional mobility.  Pt will increase R LE strength by 1/3 muscle grade in all deficient planes for increased ease with ADLs and work activities.  Pt will increase L LE strength by 1/3 muscle grade in all deficient planes for increased ease with ADLs and work activities.  Pt will be independent and consistent with issued HEP in order to show carryover between therapy sessions.    Long Term Goals: 8 weeks (progressing, not met)  Pt will report decreased L knee pain to 2/10 in order to increase tolerance for ADLs.  Pt will increase L knee flexion ROM to at least 145 degrees in order to show improved functional mobility.  Pt will increase R LE strength by 1 muscle grade in all deficient planes  in order to increase tolerance to functional activities and ADLs.  Pt will increase L LE strength by 1 muscle grade in all deficient planes  in order to increase tolerance to functional activities and ADLs.  Pt will be independent with updated HEP to maintain gains following discharge with therapy.      Plan   Plan of care Certification: 9/13/2023 to 11/10/23.    Outpatient Physical Therapy 2 times weekly for 8 weeks to include the following  interventions: Electrical Stimulation  , Gait Training, Manual Therapy, Moist Heat/ Ice, Neuromuscular Re-ed, Patient Education, Self Care, Therapeutic Activities, Therapeutic Exercise, and dry needling.     Ann Marie Jhaveri, PT

## 2023-09-22 ENCOUNTER — CLINICAL SUPPORT (OUTPATIENT)
Dept: REHABILITATION | Facility: HOSPITAL | Age: 53
End: 2023-09-22
Payer: MEDICAID

## 2023-09-22 DIAGNOSIS — R26.9 GAIT ABNORMALITY: ICD-10-CM

## 2023-09-22 DIAGNOSIS — R53.1 DECREASED STRENGTH: ICD-10-CM

## 2023-09-22 DIAGNOSIS — R26.89 DECREASED FUNCTIONAL MOBILITY: ICD-10-CM

## 2023-09-22 DIAGNOSIS — M25.60 DECREASED RANGE OF MOTION: ICD-10-CM

## 2023-09-22 DIAGNOSIS — M62.81 MUSCLE WEAKNESS: Primary | ICD-10-CM

## 2023-09-22 PROCEDURE — 97110 THERAPEUTIC EXERCISES: CPT | Mod: PN,CQ

## 2023-09-22 NOTE — PROGRESS NOTES
[facility]   Physical Therapy Treatment Note      Name: Sola Cross  Clinic Number: [id number]    Therapy Diagnosis:   Encounter Diagnoses   Name Primary?    Muscle weakness Yes    Gait abnormality     Decreased functional mobility     Decreased range of motion     Decreased strength      Physician: Kris Cullen,*    Visit Date: 9/22/2023    Physician Orders: PT Eval and Treat (follow lateral meniscus repair protocol)  Medical Diagnosis from Referral: Pain in left knee  Evaluation Date: 9/13/2023  Authorization Period Expiration: 12/31/2023  Plan of Care Expiration: 11/10/23  Visit # / Visits authorized: 15/20      Time In: 0702   Time Out: 0745   Total Time: 43 minutes  Total Billable Time: 40 minutes     Precautions: Standard     DOS: 7/19/23  Arthroscopy Knee, LEFT KNEE SCOPE WITH LATERAL REPAIR  Lateral meniscal repair  Partial medial menisectomy  Extensive debridement of 3 more compartments  Debridement of medial plica  Decompression of Baker's cyst    PTA Visit #: 1/5       Subjective     Patient reports: has appt on the 13th with orthopedist 2* feeling like she is getting another baker's cyst.   .  She was not compliant with home exercise program. Once or twice a week.  Response to previous treatment: no complaints  Functional change: none stated    Pain: 4/10  Location: left posterior knee and infrapatellar area (mostly posterior knee)    Objective      Left knee A/PROM:  125*/131* flexion  (increased today)  6*/5* extension     (decreased today)    Treatment     Sola received the treatments listed below:    Sola received therapeutic exercises to develop strength, endurance, ROM, and flexibility for 30 minutes including:  Quad sets in long sit x10, 5 sec hold  Heel prop for knee ext with 5# cuff proximal to knee, gastroc stretch with strap 30s x 3 (3 minimal total knee extn stretch time)  Bridges x10, 5 sec hold  SLR x10  SL hip abd x15  SL hip add x15  Knee flexion  stretch with strap x 15     May add: sit to stand, shuttle, mini squats, fwd step ups, lateral step downs    manual therapy techniques: Soft tissue Mobilization were applied for 10 minutes, including:  STM hamstring insertion  Patellar PROM  PROM left knee      Patient Education and Home Exercises       Education provided:   - Educated pt that he/she may feel soreness after session.      Written Home Exercises Provided: Patient instructed to cont prior HEP.     Exercises were reviewed and Sola was able to demonstrate them prior to the end of the session.  Sola demonstrated good  understanding of the education provided. See Electronic Medical Record under Patient Instructions for exercises provided during therapy sessions    Assessment     Increased left knee pain and pt is suspecting she has developed another Baker's cyst. Manual therapy today to decrease muscle tension at distal hamstrings and improve patellar PROM as she was guarded today. Progressed strengthening today without increased symptoms, but requires cues for maintaining counts and holds. Improved flexion ROM today after session but decreased extension since evaluation.  Will benefit from continued physical therapy intervention to progress toward goals set forth in plan of care to improve functional mobility and quality of life.     Sola Is progressing well towards her goals.   Patient prognosis is Good.     Patient will continue to benefit from skilled outpatient physical therapy to address the deficits listed in the problem list box on initial evaluation, provide pt/family education and to maximize pt's level of independence in the home and community environment.     Patient's spiritual, cultural and educational needs considered and pt agreeable to plan of care and goals.     Anticipated barriers to physical therapy: none stated      Goals:   Short Term Goals:  4 weeks (progressing, not met)  Pt will report decreased L knee pain to 4/10 at its worst  in order to increase tolerance for ADLs.  Pt will increase R knee extension ROM to at least 0 degrees in order to show improved functional mobility.  Pt will increase L knee flexion ROM to at least 135 degrees in order to show improved functional mobility.  Pt will increase R LE strength by 1/3 muscle grade in all deficient planes for increased ease with ADLs and work activities.  Pt will increase L LE strength by 1/3 muscle grade in all deficient planes for increased ease with ADLs and work activities.  Pt will be independent and consistent with issued HEP in order to show carryover between therapy sessions.     Long Term Goals: 8 weeks (progressing, not met)  Pt will report decreased L knee pain to 2/10 in order to increase tolerance for ADLs.  Pt will increase L knee flexion ROM to at least 145 degrees in order to show improved functional mobility.  Pt will increase R LE strength by 1 muscle grade in all deficient planes  in order to increase tolerance to functional activities and ADLs.  Pt will increase L LE strength by 1 muscle grade in all deficient planes  in order to increase tolerance to functional activities and ADLs.  Pt will be independent with updated HEP to maintain gains following discharge with therapy.       Plan     Continue per POC, progressing as appropriate to achieve stated goals.    Continue with: Plan of care Certification: 9/13/2023 to 11/10/23.     Outpatient Physical Therapy 2 times weekly for 8 weeks to include the following interventions: Electrical Stimulation  , Gait Training, Manual Therapy, Moist Heat/ Ice, Neuromuscular Re-ed, Patient Education, Self Care, Therapeutic Activities, Therapeutic Exercise, and dry needling.       Soha Scott, PTA

## 2023-09-26 NOTE — PROGRESS NOTES
[facility]   Physical Therapy Treatment Note      Name: Sola Cross  Clinic Number: [id number]    Therapy Diagnosis:   Encounter Diagnoses   Name Primary?    Muscle weakness Yes    Gait abnormality     Decreased functional mobility     Decreased range of motion     Decreased strength        Physician: Kris Cullen,*    Visit Date: 9/28/2023    Physician Orders: PT Eval and Treat (follow lateral meniscus repair protocol)  Medical Diagnosis from Referral: Pain in left knee  Evaluation Date: 9/13/2023  Authorization Period Expiration: 12/31/2023  Plan of Care Expiration: 11/10/23  Visit # / Visits authorized: 16/20      Time In: 11:35  Time Out: 12:15   Total Time: 40 minutes  Total Billable Time: 40 minutes     Precautions: Standard     DOS: 7/19/23  Arthroscopy Knee, LEFT KNEE SCOPE WITH LATERAL REPAIR  Lateral meniscal repair  Partial medial menisectomy  Extensive debridement of 3 more compartments  Debridement of medial plica  Decompression of Baker's cyst    PTA Visit #: 1/5       Subjective     Patient reports: she got a new pair of sneakers and she has less back pain at work with standing.  .  She was not compliant with home exercise program. Once or twice a week.  Response to previous treatment: no complaints  Functional change: better ROM and does not limp as much with walking    Pain: 5/10  Location: left posterior knee and infrapatellar area (mostly posterior knee)    Objective      Left knee ROM:   Flexion:  131 (increased today)  Extension: -5 deg, 0 deg      (decreased today)    Treatment     Sola received the treatments listed below:    Sola received therapeutic exercises to develop strength, endurance, ROM, and flexibility for 38 minutes including:  Quad sets in long sit x10, 5 sec hold  Heel prop for knee ext with 5# cuff proximal to knee, gastroc stretch with strap x3 min   Bridges x15, 5 sec hold  SLR x15  NP SL hip abd x15  SL hip add x15  Knee flexion  stretch with strap x 15  Hip abd walk 1 lap on stripes Y sport loop  Shuttle 2.5# 2x10  fwd step ups 6in 2x10 1 rail  Sit to stand 18in box x8 (mirror for visual cues to avoid knee valgus)     May add: mini squats, lateral step downs    manual therapy techniques: Soft tissue Mobilization were applied for 2 minutes, including:  Patellar PROM        Patient Education and Home Exercises       Education provided:   - Educated pt that he/she may feel soreness after session.      Written Home Exercises Provided: Patient instructed to cont prior HEP.     Exercises were reviewed and Sola was able to demonstrate them prior to the end of the session.  Sola demonstrated good  understanding of the education provided. See Electronic Medical Record under Patient Instructions for exercises provided during therapy sessions    Assessment     Increased left knee pain and pt is suspecting she has developed another Baker's cyst. Manual therapy today to decrease muscle tension at distal hamstrings and improve patellar PROM as she was guarded today. Progressed strengthening today without increased symptoms, but requires cues for maintaining counts and holds. Improved flexion ROM today after session but decreased extension since evaluation.  Will benefit from continued physical therapy intervention to progress toward goals set forth in plan of care to improve functional mobility and quality of life.     Sola Is progressing well towards her goals.   Patient prognosis is Good.     Patient will continue to benefit from skilled outpatient physical therapy to address the deficits listed in the problem list box on initial evaluation, provide pt/family education and to maximize pt's level of independence in the home and community environment.     Patient's spiritual, cultural and educational needs considered and pt agreeable to plan of care and goals.     Anticipated barriers to physical therapy: none stated      Goals:   Short Term Goals:  4  weeks (progressing, not met)  Pt will report decreased L knee pain to 4/10 at its worst in order to increase tolerance for ADLs.  Pt will increase R knee extension ROM to at least 0 degrees in order to show improved functional mobility.  Pt will increase L knee flexion ROM to at least 135 degrees in order to show improved functional mobility.  Pt will increase R LE strength by 1/3 muscle grade in all deficient planes for increased ease with ADLs and work activities.  Pt will increase L LE strength by 1/3 muscle grade in all deficient planes for increased ease with ADLs and work activities.  Pt will be independent and consistent with issued HEP in order to show carryover between therapy sessions.     Long Term Goals: 8 weeks (progressing, not met)  Pt will report decreased L knee pain to 2/10 in order to increase tolerance for ADLs.  Pt will increase L knee flexion ROM to at least 145 degrees in order to show improved functional mobility.  Pt will increase R LE strength by 1 muscle grade in all deficient planes  in order to increase tolerance to functional activities and ADLs.  Pt will increase L LE strength by 1 muscle grade in all deficient planes  in order to increase tolerance to functional activities and ADLs.  Pt will be independent with updated HEP to maintain gains following discharge with therapy.       Plan     Continue per POC, progressing as appropriate to achieve stated goals.    Continue with: Plan of care Certification: 9/13/2023 to 11/10/23.     Outpatient Physical Therapy 2 times weekly for 8 weeks to include the following interventions: Electrical Stimulation  , Gait Training, Manual Therapy, Moist Heat/ Ice, Neuromuscular Re-ed, Patient Education, Self Care, Therapeutic Activities, Therapeutic Exercise, and dry needling.       Ann Marie Jhaveri, PT

## 2023-09-28 ENCOUNTER — CLINICAL SUPPORT (OUTPATIENT)
Dept: REHABILITATION | Facility: HOSPITAL | Age: 53
End: 2023-09-28
Payer: MEDICAID

## 2023-09-28 DIAGNOSIS — M62.81 MUSCLE WEAKNESS: Primary | ICD-10-CM

## 2023-09-28 DIAGNOSIS — M25.60 DECREASED RANGE OF MOTION: ICD-10-CM

## 2023-09-28 DIAGNOSIS — R26.9 GAIT ABNORMALITY: ICD-10-CM

## 2023-09-28 DIAGNOSIS — R53.1 DECREASED STRENGTH: ICD-10-CM

## 2023-09-28 DIAGNOSIS — R26.89 DECREASED FUNCTIONAL MOBILITY: ICD-10-CM

## 2023-09-28 PROCEDURE — 97110 THERAPEUTIC EXERCISES: CPT | Mod: PN

## 2023-10-04 NOTE — PROGRESS NOTES
[facility]   Physical Therapy Treatment Note      Name: Sola Cross  Clinic Number: [id number]    Therapy Diagnosis:   Encounter Diagnoses   Name Primary?    Muscle weakness Yes    Gait abnormality     Decreased functional mobility     Decreased range of motion     Decreased strength          Physician: Kris Cullen,*    Visit Date: 10/5/2023    Physician Orders: PT Eval and Treat (follow lateral meniscus repair protocol)  Medical Diagnosis from Referral: Pain in left knee  Evaluation Date: 9/13/2023  Authorization Period Expiration: 12/31/2023  Plan of Care Expiration: 11/10/23  Visit # / Visits authorized: 17/20      Time In: 8:35  Time Out: 9:15   Total Time: 40 minutes  Total Billable Time: 40 minutes     Precautions: Standard     DOS: 7/19/23  Arthroscopy Knee, LEFT KNEE SCOPE WITH LATERAL REPAIR  Lateral meniscal repair  Partial medial menisectomy  Extensive debridement of 3 more compartments  Debridement of medial plica  Decompression of Baker's cyst    PTA Visit #: 1/5       Subjective     Patient reports: she had increased pain to L knee yesterday.  She said she felt like she was limping last night.  .  She was not compliant with home exercise program. Once or twice a week.  Response to previous treatment: no complaints  Functional change: better ROM and does not limp as much with walking    Pain: 5/10  Location: left posterior knee and infrapatellar area (mostly posterior knee)    Objective      Left knee ROM:   Flexion:  135 (increased today)  Extension: -2 deg, 0 deg          Treatment     Sola received the treatments listed below:      Sola received therapeutic exercises to develop strength, endurance, ROM, and flexibility for 38 minutes including:  Quad sets in long sit x10, 5 sec hold  Heel prop for knee ext with 5# cuff proximal to knee, gastroc stretch with strap x3 min   Bridges x20, 5 sec hold  SLR x15  NP SL hip abd x15  SL hip add x15  Knee flexion  stretch with strap x 15  Hip abd walk 2 laps in // bars Y sport loop  Shuttle 2.5 bands 2x10  2:1 shuttle 2 bands x5, stopped due to L knee pain  Attempted Pilates stepper 2 springs L2, but stopped due to increased L knee pain    Not performed due to time:  fwd step ups 6in 2x10 1 rail  Sit to stand 18in box x8 (mirror for visual cues to avoid knee valgus)     May add: mini squats, lateral step downs    manual therapy techniques: Soft tissue Mobilization were applied for 2 minutes, including:  Patellar PROM        Patient Education and Home Exercises       Education provided:   - Educated pt that he/she may feel soreness after session.      Written Home Exercises Provided: Patient instructed to cont prior HEP.     Exercises were reviewed and Sola was able to demonstrate them prior to the end of the session.  Sola demonstrated good  understanding of the education provided. See Electronic Medical Record under Patient Instructions for exercises provided during therapy sessions    Assessment     Pt with increased L knee pain today of unknown cause yesterday.  She demonstrated improved L knee ROM, but she did have difficulty with closed chain exercises today due to pain.  Will benefit from continued physical therapy intervention to progress toward goals set forth in plan of care to improve functional mobility and quality of life.     Sola Is progressing well towards her goals.   Patient prognosis is Good.     Patient will continue to benefit from skilled outpatient physical therapy to address the deficits listed in the problem list box on initial evaluation, provide pt/family education and to maximize pt's level of independence in the home and community environment.     Patient's spiritual, cultural and educational needs considered and pt agreeable to plan of care and goals.     Anticipated barriers to physical therapy: none stated      Goals:   Short Term Goals:  4 weeks (progressing, not met)  Pt will report  decreased L knee pain to 4/10 at its worst in order to increase tolerance for ADLs.  Pt will increase R knee extension ROM to at least 0 degrees in order to show improved functional mobility.  Pt will increase L knee flexion ROM to at least 135 degrees in order to show improved functional mobility.  Pt will increase R LE strength by 1/3 muscle grade in all deficient planes for increased ease with ADLs and work activities.  Pt will increase L LE strength by 1/3 muscle grade in all deficient planes for increased ease with ADLs and work activities.  Pt will be independent and consistent with issued HEP in order to show carryover between therapy sessions.     Long Term Goals: 8 weeks (progressing, not met)  Pt will report decreased L knee pain to 2/10 in order to increase tolerance for ADLs.  Pt will increase L knee flexion ROM to at least 145 degrees in order to show improved functional mobility.  Pt will increase R LE strength by 1 muscle grade in all deficient planes  in order to increase tolerance to functional activities and ADLs.  Pt will increase L LE strength by 1 muscle grade in all deficient planes  in order to increase tolerance to functional activities and ADLs.  Pt will be independent with updated HEP to maintain gains following discharge with therapy.       Plan     Continue per POC, progressing as appropriate to achieve stated goals.    Continue with: Plan of care Certification: 9/13/2023 to 11/10/23.     Outpatient Physical Therapy 2 times weekly for 8 weeks to include the following interventions: Electrical Stimulation  , Gait Training, Manual Therapy, Moist Heat/ Ice, Neuromuscular Re-ed, Patient Education, Self Care, Therapeutic Activities, Therapeutic Exercise, and dry needling.       Ann Marie Jhaveri, PT

## 2023-10-05 ENCOUNTER — CLINICAL SUPPORT (OUTPATIENT)
Dept: REHABILITATION | Facility: HOSPITAL | Age: 53
End: 2023-10-05
Payer: MEDICAID

## 2023-10-05 DIAGNOSIS — R26.89 DECREASED FUNCTIONAL MOBILITY: ICD-10-CM

## 2023-10-05 DIAGNOSIS — M25.60 DECREASED RANGE OF MOTION: ICD-10-CM

## 2023-10-05 DIAGNOSIS — M62.81 MUSCLE WEAKNESS: Primary | ICD-10-CM

## 2023-10-05 DIAGNOSIS — R26.9 GAIT ABNORMALITY: ICD-10-CM

## 2023-10-05 DIAGNOSIS — R53.1 DECREASED STRENGTH: ICD-10-CM

## 2023-10-05 PROCEDURE — 97110 THERAPEUTIC EXERCISES: CPT | Mod: PN

## 2023-10-26 ENCOUNTER — PROCEDURE VISIT (OUTPATIENT)
Dept: NEUROLOGY | Facility: CLINIC | Age: 53
End: 2023-10-26
Payer: MEDICAID

## 2023-10-26 VITALS
BODY MASS INDEX: 26.86 KG/M2 | TEMPERATURE: 97 F | WEIGHT: 145.94 LBS | SYSTOLIC BLOOD PRESSURE: 128 MMHG | HEART RATE: 98 BPM | RESPIRATION RATE: 17 BRPM | DIASTOLIC BLOOD PRESSURE: 85 MMHG | HEIGHT: 62 IN

## 2023-10-26 DIAGNOSIS — G43.719 INTRACTABLE CHRONIC MIGRAINE WITHOUT AURA AND WITHOUT STATUS MIGRAINOSUS: Primary | ICD-10-CM

## 2023-10-26 PROCEDURE — 99999PBSHW PR PBB SHADOW TECHNICAL ONLY FILED TO HB: ICD-10-PCS | Mod: JW,PBBFAC,,

## 2023-10-26 PROCEDURE — 64615 PR CHEMODENERVATION OF MUSCLE FOR CHRONIC MIGRAINE: ICD-10-PCS | Mod: S$PBB,,, | Performed by: NURSE PRACTITIONER

## 2023-10-26 PROCEDURE — 99999PBSHW PR PBB SHADOW TECHNICAL ONLY FILED TO HB: Mod: JW,PBBFAC,,

## 2023-10-26 PROCEDURE — 64615 CHEMODENERV MUSC MIGRAINE: CPT | Mod: PBBFAC,PO | Performed by: NURSE PRACTITIONER

## 2023-10-26 PROCEDURE — 64615 CHEMODENERV MUSC MIGRAINE: CPT | Mod: S$PBB,,, | Performed by: NURSE PRACTITIONER

## 2023-10-26 RX ADMIN — ONABOTULINUMTOXINA 200 UNITS: 100 INJECTION, POWDER, LYOPHILIZED, FOR SOLUTION INTRADERMAL; INTRAMUSCULAR at 02:10

## 2023-10-26 NOTE — PROCEDURES
Procedures    A time out was conducted just before the start of the procedure to verify the correct patient and procedure, procedure location, and all relevant critical information.      Conventional methods of treatment such as multiple medications, both on and   off label have been tried including: Lexapro, trazodone, sertraline, Unable to do antihypertensive ssecondary to low blood pressuer at baseline. Unable to do Depakote due to weight and currently using ozempic for weight loss  Unable to do topiramate secondary to ADHD   Unable to use TCA due to trazodone use ofr sleep and weight.      The patient has been unresponsive and refractory.The patient meets criteria for chronic headaches according to the ICHD-II, the patient has more than 15 headaches a month which last for more than 4 hours a day.     Botox session number: 6  Last session was 12 weeks ago and resulted in improvement of: 75%     I am aiming for at least 50%  improvement in the patient's symptoms. Frequency of treatment is every 3 months unless no response to the treatments, at which time we will discontinue the injections.      DESCRIPTION OF PROCEDURE: After obtaining informed consent and under   aseptic technique, a total of 125 units of botulinum toxin type A were   injected in the following muscles:      -- Procerus 5 units  --  5 units bilaterally  -- Frontalis 20 units  -- Temporalis 20 units bilaterally  -- Occipitalis 15 units bilaterally  -- Upper cervical paraspinals 10 units bilaterally  -- SPARED Trapezius 15 units bilaterally. (Due to weakness)     The patient tolerated the procedure well. There were no complications. The patient was given a prescription for repeat treatment in 12 weeks      Unavoidable waste 75 units    GUANAKITO Barfield

## 2024-01-17 ENCOUNTER — TELEPHONE (OUTPATIENT)
Dept: NEUROLOGY | Facility: CLINIC | Age: 54
End: 2024-01-17
Payer: OTHER MISCELLANEOUS

## 2024-01-17 ENCOUNTER — PATIENT MESSAGE (OUTPATIENT)
Dept: NEUROLOGY | Facility: CLINIC | Age: 54
End: 2024-01-17
Payer: OTHER MISCELLANEOUS

## 2024-01-22 ENCOUNTER — PROCEDURE VISIT (OUTPATIENT)
Dept: NEUROLOGY | Facility: CLINIC | Age: 54
End: 2024-01-22

## 2024-01-22 VITALS
HEIGHT: 62 IN | DIASTOLIC BLOOD PRESSURE: 70 MMHG | HEART RATE: 121 BPM | BODY MASS INDEX: 28.72 KG/M2 | WEIGHT: 156.06 LBS | SYSTOLIC BLOOD PRESSURE: 130 MMHG | RESPIRATION RATE: 17 BRPM

## 2024-01-22 DIAGNOSIS — G43.719 INTRACTABLE CHRONIC MIGRAINE WITHOUT AURA AND WITHOUT STATUS MIGRAINOSUS: Primary | ICD-10-CM

## 2024-01-22 DIAGNOSIS — R11.0 NAUSEA: ICD-10-CM

## 2024-01-22 PROCEDURE — 64615 CHEMODENERV MUSC MIGRAINE: CPT | Mod: S$PBB,,, | Performed by: NURSE PRACTITIONER

## 2024-01-22 PROCEDURE — 99999PBSHW PR PBB SHADOW TECHNICAL ONLY FILED TO HB: Mod: JW,PBBFAC,,

## 2024-01-22 PROCEDURE — 64615 CHEMODENERV MUSC MIGRAINE: CPT | Mod: PBBFAC,PO | Performed by: NURSE PRACTITIONER

## 2024-01-22 RX ORDER — ONDANSETRON 4 MG/1
4 TABLET, ORALLY DISINTEGRATING ORAL EVERY 6 HOURS PRN
Qty: 30 TABLET | Refills: 1 | Status: SHIPPED | OUTPATIENT
Start: 2024-01-22

## 2024-01-22 RX ADMIN — ONABOTULINUMTOXINA 200 UNITS: 100 INJECTION, POWDER, LYOPHILIZED, FOR SOLUTION INTRADERMAL; INTRAMUSCULAR at 02:01

## 2024-01-22 NOTE — PROCEDURES
Procedures    A time out was conducted just before the start of the procedure to verify the correct patient and procedure, procedure location, and all relevant critical information.      Conventional methods of treatment such as multiple medications, both on and   off label have been tried including: Lexapro, trazodone, sertraline, Unable to do antihypertensive ssecondary to low blood pressuer at baseline. Unable to do Depakote due to weight and currently using ozempic for weight loss  Unable to do topiramate secondary to ADHD   Unable to use TCA due to trazodone use ofr sleep and weight.      The patient has been unresponsive and refractory.The patient meets criteria for chronic headaches according to the ICHD-II, the patient has more than 15 headaches a month which last for more than 4 hours a day.     Botox session number: 7  Last session was 12 weeks ago and resulted in improvement of: 75%     I am aiming for at least 50%  improvement in the patient's symptoms. Frequency of treatment is every 3 months unless no response to the treatments, at which time we will discontinue the injections.      DESCRIPTION OF PROCEDURE: After obtaining informed consent and under   aseptic technique, a total of 125 units of botulinum toxin type A were   injected in the following muscles:      -- Procerus 5 units  --  5 units bilaterally  -- Frontalis 20 units  -- Temporalis 20 units bilaterally  -- Occipitalis 15 units bilaterally  -- Upper cervical paraspinals 10 units bilaterally  -- SPARED Trapezius 15 units bilaterally. (Due to weakness)     The patient tolerated the procedure well. There were no complications. The patient was given a prescription for repeat treatment in 12 weeks      Unavoidable waste 75 units    GUANAKITO Barfield

## 2024-01-23 ENCOUNTER — TELEPHONE (OUTPATIENT)
Dept: NEUROLOGY | Facility: CLINIC | Age: 54
End: 2024-01-23
Payer: OTHER MISCELLANEOUS

## 2024-01-23 DIAGNOSIS — G43.719 INTRACTABLE CHRONIC MIGRAINE WITHOUT AURA AND WITHOUT STATUS MIGRAINOSUS: Primary | ICD-10-CM

## 2024-04-09 ENCOUNTER — PATIENT MESSAGE (OUTPATIENT)
Dept: NEUROLOGY | Facility: CLINIC | Age: 54
End: 2024-04-09
Payer: OTHER MISCELLANEOUS

## 2024-04-11 ENCOUNTER — TELEPHONE (OUTPATIENT)
Dept: NEUROLOGY | Facility: CLINIC | Age: 54
End: 2024-04-11
Payer: OTHER MISCELLANEOUS

## 2024-04-11 NOTE — TELEPHONE ENCOUNTER
Called and spoke with patient and rescheduled Botox. Patient is self pay, on payment plan. Verbalized understanding.

## 2024-04-29 DIAGNOSIS — M25.562 PAIN IN LEFT KNEE: Primary | ICD-10-CM

## 2024-05-01 ENCOUNTER — PATIENT MESSAGE (OUTPATIENT)
Dept: NEUROLOGY | Facility: CLINIC | Age: 54
End: 2024-05-01
Payer: OTHER MISCELLANEOUS

## 2024-06-26 ENCOUNTER — PATIENT MESSAGE (OUTPATIENT)
Dept: NEUROLOGY | Facility: CLINIC | Age: 54
End: 2024-06-26
Payer: OTHER MISCELLANEOUS

## 2025-02-11 ENCOUNTER — TELEPHONE (OUTPATIENT)
Dept: NEUROLOGY | Facility: CLINIC | Age: 55
End: 2025-02-11
Payer: OTHER MISCELLANEOUS

## 2025-02-11 DIAGNOSIS — G43.719 INTRACTABLE CHRONIC MIGRAINE WITHOUT AURA AND WITHOUT STATUS MIGRAINOSUS: ICD-10-CM

## 2025-02-11 RX ORDER — RIZATRIPTAN BENZOATE 10 MG/1
TABLET ORAL
Qty: 9 TABLET | Refills: 11 | Status: SHIPPED | OUTPATIENT
Start: 2025-02-11

## 2025-02-11 NOTE — TELEPHONE ENCOUNTER
----- Message from Nurse Miramontes sent at 2/11/2025 11:07 AM CST -----  Regarding: FW: appointment  Contact: patient  Pt scheduled a yearly appt for Tues 2/18, however, she is out of Maxalt and requesting a refill of Maxalt called into [facility] please  ----- Message -----  From: Asher Treviño  Sent: 2/11/2025  10:14 AM CST  To: Camilo LEONARD Staff  Subject: appointment                                      Type:  Patient Returning Call    Who Called:patient  Who Left Message for Patient:staff  Does the patient know what this is regarding?:call center unable to schedule/ please call  Would the patient rather a call back or a response via [facility]? Patient requesting appointment  Best Call Back Number:[phone]  Additional Information:

## 2025-02-18 ENCOUNTER — OFFICE VISIT (OUTPATIENT)
Dept: NEUROLOGY | Facility: CLINIC | Age: 55
End: 2025-02-18
Payer: COMMERCIAL

## 2025-02-18 VITALS
WEIGHT: 166.44 LBS | SYSTOLIC BLOOD PRESSURE: 117 MMHG | HEART RATE: 96 BPM | TEMPERATURE: 98 F | HEIGHT: 62 IN | RESPIRATION RATE: 17 BRPM | BODY MASS INDEX: 30.63 KG/M2 | DIASTOLIC BLOOD PRESSURE: 74 MMHG

## 2025-02-18 DIAGNOSIS — G43.719 INTRACTABLE CHRONIC MIGRAINE WITHOUT AURA AND WITHOUT STATUS MIGRAINOSUS: Primary | ICD-10-CM

## 2025-02-18 RX ORDER — UBROGEPANT 100 MG/1
TABLET ORAL
Qty: 16 TABLET | Refills: 11 | Status: SHIPPED | OUTPATIENT
Start: 2025-02-18

## 2025-02-18 NOTE — PROGRESS NOTES
Date of service: 2/21/2025  Referring provider: No ref. provider found    Subjective:      Chief complaint: Headache       Patient ID: Sola Cross is a 54 y.o. female     History of Present Illness    INTERVAL HISTORY 2/18/25    Last office visit was over two years ago and and last Botox was one year ago.    Today she reports she is worse. She is now having severe migraines that last for weeks at a time. Current pain 8 with range 4-10. She has a daily headache. She takes tylenol, ibuprofen, naproxen and rizatriptan daily. Otherwise information below is reviewed and verified with no changes made     ORIGINAL HEADACHE HISTORY - from 8/29/22 with Dr. Hoffman  Age at onset and course over time: first migraine after she had her first child in 1996 she was 26 years old. She had gone to neurologist in the past and was told it was stress induced migraine. lifelong headache attacks, She has had headache every day for many years she had been with OTC medications. Then things worsened after she had covid since June 2022 and since then the migraine is now much worse and now having more facial pain and all head pain. She mentions that HA is her first symptom of COVID and has continued. She mentions that she has holocephalic headache now. She mentions that she lives with severe back pain, but this is very disruptive now.   Location: fronrtal/periorbital, but can be holocephacli   Character: throbbing/pounding, tight band, pressure   Intensity:  2-10/10, currently 2/10   Baseline pain level is 3-4/10 and escalations to severe pain 5 or more days a week.   Frequency: 30/30  No headache free times   Duration: constant baseline, escalations >4 hours to days   Aura: none   Associated symptoms: photophobia, phonophobia, osmophobia, kinesiophobia, neck tightness/pain, nausea  Other neurologic symptoms: dizziness, blurry vision, mood changes, difficulty concentarting, neck tightness, nasal congestion, sinus pressure   Precipitating  "factors: sleep deprivation, weather changes, stress  Alleviating factors: sleep, darkness,  massage, local pressure, medications  Aggravating factors: exposure to light, sound, if has attack already coughing/sneezing/bending over  Family history of headache: mother and sister had migraine.   ER/UC visits: 0  Caffeine: "plenty"  Sleep: insomnia- trouble initiating/maintaining sleep , uses trazodone nightly.   Gyn: hysterectomy, menopausal     Current acute treatment:  Rizatriptan  Zofran    Current prevention:  Lexapro  Trazodone    Previously tried/failed acute treatment:  Sumatriptan  Excedrin    Previously tried/failed preventative treatment:  Wellbutrin  Celexa  Sertraline   Botox - 9/22-1/24  Unable to do antihypertensive ssecondary to low blood pressuer at baseline  Unable to do Depakote due to weight and currently using ozempic for weight loss  Unable to do topiramate secondary to ADHD   Unable to use TCA due to trazodone use ofr sleep and weight.     Review of patient's allergies indicates:   Allergen Reactions    Demerol [meperidine] Itching    Niacin preparations Other (See Comments)     RED MAN SYNDRONE    Penicillins Swelling     OF THROAT    Sudafed [pseudoephedrine hcl] Palpitations    Wellbutrin [bupropion hcl] Photosensitivity, Nausea Only, Anxiety, Palpitations and Other (See Comments)     Leg numbness, sorrow, unexplained crying     Current Medications[1]    Past Medical History  Past Medical History:   Diagnosis Date    Adjustment disorder with anxiety 08/06/2015    Anemia 08/06/2015    Attention deficit disorder with hyperactivity(314.01) 08/06/2015    Bronchitis with flu     Complete tear of right rotator cuff 12/27/2016    Depression     General anesthetics causing adverse effect in therapeutic use     Headache     Hypotension, iatrogenic     TMJ (dislocation of temporomandibular joint)        Past Surgical History  Past Surgical History:   Procedure Laterality Date    ADENOIDECTOMY      BREAST " BIOPSY      COLONOSCOPY      GASTRIC BYPASS      HYSTERECTOMY      ROTATOR CUFF REPAIR Right     SHOULDER ARTHROSCOPY Right 12/27/2016    RCR w/patch    TONSILLECTOMY         Family History  Family History   Problem Relation Name Age of Onset    Cancer Mother      Diabetes Father      Diabetes Sister      Arthritis Maternal Grandmother         Social History  Social History[2]     Objective:        Vitals:    02/18/25 1528   BP: 117/74   Pulse: 96   Resp: 17   Temp: 97.6 °F (36.4 °C)     Body mass index is 30.44 kg/m².    2/18/25    Data Review:     I have personally reviewed the referring provider's notes, labs, & imaging made available to me today.      RADIOLOGY STUDIES:  I have personally reviewed the pertinent images performed.       Results for orders placed or performed during the hospital encounter of 09/14/22   MRI Brain W WO Contrast    Narrative    EXAMINATION:  MRI BRAIN W WO CONTRAST    CLINICAL HISTORY:  severe worsening since COVID 6/22, worsening daily headache.;.  Headache, unspecified    TECHNIQUE:  Multiplanar multisequence MR imaging of the brain was performed before and after the administration of 6 mL Gadavist  intravenous contrast.    COMPARISON:  CT head 07/16/2021    FINDINGS:  Ventricles and sulci are normal in size for age without evidence of hydrocephalus.    Few scattered punctate T2/FLAIR hyperintense foci in the bilateral frontal and parietal subcortical white matter.  Findings are nonspecific, and could reflect sequela of chronic migraine headaches or early microvascular ischemic change, among other etiologies.   Diffusion-weighted images demonstrate no evidence of an acute infarct.   Susceptibility weighted images demonstrate no evidence of acute or chronic hemorrhage. No mass effect or midline shift.    No abnormal intracranial enhancement.    Normal vascular flow voids are preserved.    Bone marrow signal intensity is normal.  Mild scattered mucosal membrane thickening throughout  the paranasal sinuses.  The visualized portions of the mastoids are unremarkable.  Orbits are unremarkable.      Impression    1. No evidence of an acute intracranial abnormality or abnormal enhancement.  2. Few scattered punctate T2/FLAIR hyperintense foci within the bilateral supratentorial white matter, nonspecific and may reflect sequelae of chronic migraine headaches or microvascular ischemic change.  3. Mild paranasal sinus disease.      Electronically signed by: Nura Reynolds  Date:    09/14/2022  Time:    10:47   Results for orders placed or performed during the hospital encounter of 07/16/21   CT Head Without Contrast    Narrative    EXAMINATION:  CT HEAD WITHOUT CONTRAST    CLINICAL HISTORY:  Altered mental status;    TECHNIQUE:  Low dose axial images were obtained through the head.  Coronal and sagittal reformations were also performed. Contrast was not administered.  Automated exposure control was utilized.   [id number]    COMPARISON:  None.    FINDINGS:  No focal lesions are identified.  There is no evidence of ventricular dilatation, midline shift, or hemorrhage.  No territorial infarct was visualized.  No extra cerebral fluid collection was demonstrated.  The bony calvarium appears intact.  No sinus or mastoid abnormality was noted.      Impression    Negative study.      Electronically signed by: Riaz Brooks MD  Date:    07/16/2021  Time:    15:49       Lab Results   Component Value Date     (L) 06/19/2023    K 4.6 06/19/2023    MG 2.0 07/16/2021    CL 96 06/19/2023    CO2 31 06/19/2023    BUN 16 06/19/2023    CREATININE 0.54 06/19/2023    GLU 59 (L) 06/19/2023    HGBA1C 5.4 09/29/2020    AST 26 06/19/2023    ALT 18 06/19/2023    ALBUMIN 4.4 06/19/2023    PROT 6.7 06/19/2023    BILITOT 0.2 06/19/2023    CHOL 219 (H) 09/29/2020    HDL 83 (H) 09/29/2020    LDLCALC 115.2 09/29/2020    TRIG 104 09/29/2020       Lab Results   Component Value Date    WBC 6.79 06/19/2023    HGB 11.5 (L) 06/19/2023     HCT 33.3 (L) 06/19/2023    MCV 92 06/19/2023     06/19/2023       Lab Results   Component Value Date    TSH 1.550 06/19/2023           Assessment & Plan:       Problem List Items Addressed This Visit          Neuro    Intractable chronic migraine without aura and without status migrainosus - Primary    Overview   Migraine headaches since her 20's. Headaches are typically unilateral, moderate to severe in intensity, worsen with activity, pounding in quality and associated with sensitivity to light and sound.   Gradual progression pattern, lack of red flag features on history, and normal neurological exam are reassuring for primary as opposed to secondary etiology of headaches thus imaging will not be pursued for this history and this exam at this time.  The patient has chronic migraines ( G43.719) and suffers from headaches more than 3 months, more than 15 days of headache days per month lasting more than 4 hours with at least 8 attacks that meet criteria for migraine. She has tried multiple medications including but not limited to Lexapro, Trazodone, Wellbutrin, Celexa, Sertraline, Unable to do antihypertensive ssecondary to low blood pressuer at baseline  Unable to do Depakote due to weight and currently using ozempic for weight loss  Unable to do topiramate secondary to ADHD   Unable to use TCA due to trazodone use ofr sleep and weight.   The patient has been unresponsive and refractory.The patient meets criteria for chronic headaches according to the ICHD-II, the patient has more than 15 headaches a month which last for more than 4 hours a day. The patient is an ideal candidate for Botox. After treatment, I expect 50%  improvement in the patient's symptoms. A reduction of at least 7 days per month and the number of cumulative hours suffering with headaches as well as at least 100 total hours affected with migraine per month.  DESCRIPTION OF PROCEDURE: After obtaining informed consent and under aseptic  technique, a total of 155 units of botulinum toxin type A to be injected in the following muscles:      -- Procerus 5 units  --  5 units bilaterally  -- Frontalis 20 units  -- Temporalis 20 units bilaterally  -- Occipitalis 15 units bilaterally  -- Upper cervical paraspinals 10 units bilaterally  -- Trapezius 15 units bilaterally.       Unavoidable waste 45 units    Add gepant for acute attacks.           Relevant Medications    ubrogepant (UBRELVY) 100 mg tablet    Other Relevant Orders    Prior authorization Order           Please call our clinic at [phone] or send a message on the [employer] portal if there are any changes to the plan described below, for example,if you are not contacted for the requested tests, referral(s) within one week, if you are unable to receive the medications prescribed, or if you feel you need to change the treatment course for any reason.     TESTING:  -- none    REFERRALS:  -- none    PREVENTION (use daily regardless of headache):  -- SEEK authorization to restart Botox  -- start magnesium in ONE of the following preparations -               1. Magnesium oxide 800mg daily (the most common over the counter kind, may causes loose stools)              2. Magnesium citrate 400-500mg daily (harder to find, but more neutral on the bowels)              3. Magnesium glycinate 400mg daily (hardest to find, look online, but most bowel-neutral, best absorbed)     AS-NEEDED TREATMENT (use total no more than 10 days per month unless otherwise stated):  -- START Ubrelvy with next migraine. You can repeat two hours later if needed. With this medication do not drink grapefruit juice or eat grapefruit or some medications like ketoconazole, itraconazole, or antibiotics clarithromycin     Follow up in about 3 weeks (around 3/11/2025) for first botox.    Face to Face time with patient: 25  34 minutes of total time spent on the encounter, which includes face to face time and non-face to face  time on day of visit preparing to see the patient (eg, review of tests), Obtaining and/or reviewing separately obtained history, Documenting clinical information in the electronic or other health record, Independently interpreting results (not separately reported) and communicating results to the patient/family/caregiver, or Care coordination (not separately reported).     Lyric Page NP                 [1]   Current Outpatient Medications   Medication Sig Dispense Refill    CHOLECALCIFEROL, VITAMIN D3, (VITAMIN D3 ORAL) Take 1 tablet by mouth once daily.       CYANOCOBALAMIN, VITAMIN B-12, ORAL Take 1 tablet by mouth once daily.       dextroamphetamine-amphetamine (ADDERALL XR) 30 MG 24 hr capsule Take 1 capsule (30 mg total) by mouth every morning. 30 capsule 0    [START ON 3/19/2025] dextroamphetamine-amphetamine (ADDERALL XR) 30 MG 24 hr capsule Take 1 capsule (30 mg total) by mouth every morning. 30 capsule 0    [START ON 4/18/2025] dextroamphetamine-amphetamine (ADDERALL XR) 30 MG 24 hr capsule Take 1 capsule (30 mg total) by mouth every morning. 30 capsule 0    dextroamphetamine-amphetamine (ADDERALL) 30 mg Tab One table Daily after lunch. 30 tablet 0    [START ON 3/19/2025] dextroamphetamine-amphetamine (ADDERALL) 30 mg Tab One table Daily after lunch. 30 tablet 0    [START ON 4/18/2025] dextroamphetamine-amphetamine (ADDERALL) 30 mg Tab One tablet Daily after lunch. 30 tablet 0    dextroamphetamine-amphetamine (ADDERALL) 30 mg Tab Take 1 tablet (30 mg total) by mouth after lunch. 30 tablet 0    EScitalopram oxalate (LEXAPRO) 20 MG tablet Take 1 tablet (20 mg total) by mouth once daily. 90 tablet 1    ferrous sulfate 325 mg (65 mg iron) Tab tablet Take 325 mg by mouth daily with breakfast.      fluticasone propionate (FLONASE) 50 mcg/actuation nasal spray 1 spray (50 mcg total) by Each Nostril route once daily. 16 g 2    hydrOXYzine HCL (ATARAX) 25 MG tablet TAKE 1 TABLET(25 MG) BY MOUTH EVERY NIGHT  AS NEEDED FOR INSOMNIA 90 tablet 1    multivitamin (THERAGRAN) per tablet Take 1 tablet by mouth once daily.      ondansetron (ZOFRAN-ODT) 4 MG TbDL Take 1 tablet (4 mg total) by mouth every 6 (six) hours as needed (nausea). 30 tablet 1    rizatriptan (MAXALT) 10 MG tablet 1 tab PO PRN migraine. May repeat every 2 hours for max 3 tabs in 24 hours. Use no more than 10 days per month. 9 tablet 11    spironolactone (ALDACTONE) 50 MG tablet Take 50 mg by mouth 2 (two) times daily.      tiZANidine (ZANAFLEX) 4 MG tablet Take 1 tablet (4 mg total) by mouth nightly as needed (pain). 30 tablet 1    traMADoL (ULTRAM) 50 mg tablet Take 50 mg by mouth every 6 (six) hours.      traZODone (DESYREL) 100 MG tablet Take 2 tablets (200 mg total) by mouth every evening. TAKE 2 TABLETS BY MOUTH NIGHTLY AS NEEDED FOR INSOMNIA 180 tablet 1    UNABLE TO FIND medication name: Hormone Replacement      diclofenac sodium (VOLTAREN) 1 % Gel Apply 2 g topically 4 (four) times daily. (Patient not taking: Reported on 2/18/2025) 150 g 2    HYDROcodone-acetaminophen (NORCO) 5-325 mg per tablet Take 1 tablet by mouth every 6 (six) hours as needed for Pain. (Patient not taking: Reported on 2/18/2025) 10 tablet 0    naproxen sodium (ANAPROX) 550 MG tablet Take 1 tablet (550 mg total) by mouth 2 (two) times daily with meals. (Patient not taking: Reported on 11/6/2024) 20 tablet 0    ubrogepant (UBRELVY) 100 mg tablet Take 1 tablet by mouth as needed for migraine. May repeat in 2 hours if needed. Max 2 tablets per day 16 tablet 11     Current Facility-Administered Medications   Medication Dose Route Frequency Provider Last Rate Last Admin    onabotulinumtoxina injection 200 Units  200 Units Intramuscular q12 weeks Lyric Page, NP   200 Units at 12/13/22 0959    onabotulinumtoxina injection 200 Units  200 Units Intramuscular q12 weeks Lyric Page NP   200 Units at 02/28/23 1003    onabotulinumtoxina injection 200 Units  200 Units  Intramuscular q12 weeks Lyric Page, NP   200 Units at 01/22/24 1453   [2]   Social History  Socioeconomic History    Marital status: Single   Tobacco Use    Smoking status: Never    Smokeless tobacco: Never   Substance and Sexual Activity    Alcohol use: Yes     Alcohol/week: 0.0 standard drinks of alcohol     Comment: ONCE EVERY 2 MONTHS    Drug use: No    Sexual activity: Yes     Social Drivers of Health     Stress: No Stress Concern Present (9/18/2019)    [employer] - Occupational Stress Questionnaire     Feeling of Stress : Only a little

## 2025-02-18 NOTE — PATIENT INSTRUCTIONS
Please call our clinic at [phone] or send a message on the [employer] portal if there are any changes to the plan described below, for example,if you are not contacted for the requested tests, referral(s) within one week, if you are unable to receive the medications prescribed, or if you feel you need to change the treatment course for any reason.     TESTING:  -- none    REFERRALS:  -- none    PREVENTION (use daily regardless of headache):  -- SEEK authorization to restart Botox  -- start magnesium in ONE of the following preparations -               1. Magnesium oxide 800mg daily (the most common over the counter kind, may causes loose stools)              2. Magnesium citrate 400-500mg daily (harder to find, but more neutral on the bowels)              3. Magnesium glycinate 400mg daily (hardest to find, look online, but most bowel-neutral, best absorbed)     AS-NEEDED TREATMENT (use total no more than 10 days per month unless otherwise stated):  -- START Ubrelvy with next migraine. You can repeat two hours later if needed. With this medication do not drink grapefruit juice or eat grapefruit or some medications like ketoconazole, itraconazole, or antibiotics clarithromycin

## 2025-03-27 ENCOUNTER — PROCEDURE VISIT (OUTPATIENT)
Dept: NEUROLOGY | Facility: CLINIC | Age: 55
End: 2025-03-27
Payer: COMMERCIAL

## 2025-03-27 VITALS
BODY MASS INDEX: 30.63 KG/M2 | DIASTOLIC BLOOD PRESSURE: 71 MMHG | RESPIRATION RATE: 17 BRPM | WEIGHT: 166.44 LBS | HEIGHT: 62 IN | HEART RATE: 99 BPM | SYSTOLIC BLOOD PRESSURE: 121 MMHG | TEMPERATURE: 98 F

## 2025-03-27 DIAGNOSIS — G43.719 INTRACTABLE CHRONIC MIGRAINE WITHOUT AURA AND WITHOUT STATUS MIGRAINOSUS: Primary | ICD-10-CM

## 2025-03-27 RX ADMIN — ONABOTULINUMTOXINA 200 UNITS: 100 INJECTION, POWDER, LYOPHILIZED, FOR SOLUTION INTRADERMAL; INTRAMUSCULAR at 03:03

## 2025-03-27 NOTE — PROCEDURES
Procedures    A time out was conducted just before the start of the procedure to verify the correct patient and procedure, procedure location, and all relevant critical information.      Conventional methods of treatment such as multiple medications, both on and   off label have been tried including: Lexapro, trazodone, sertraline, Unable to do antihypertensive ssecondary to low blood pressuer at baseline. Unable to do Depakote due to weight and currently using ozempic for weight loss  Unable to do topiramate secondary to ADHD   Unable to use TCA due to trazodone use ofr sleep and weight.      The patient has been unresponsive and refractory.The patient meets criteria for chronic headaches according to the ICHD-II, the patient has more than 15 headaches a month which last for more than 4 hours a day.     Botox session number: 1  Last session was 12 weeks ago and resulted in improvement of: n/a     I am aiming for at least 50%  improvement in the patient's symptoms. Frequency of treatment is every 3 months unless no response to the treatments, at which time we will discontinue the injections.      DESCRIPTION OF PROCEDURE: After obtaining informed consent and under   aseptic technique, a total of 125 units of botulinum toxin type A were   injected in the following muscles:      -- Procerus 5 units  --  5 units bilaterally  -- Frontalis 20 units  -- Temporalis 20 units bilaterally  -- Occipitalis 15 units bilaterally  -- Upper cervical paraspinals 10 units bilaterally  -- SPARED Trapezius 15 units bilaterally. (Due to weakness)     The patient tolerated the procedure well. There were no complications. The patient was given a prescription for repeat treatment in 12 weeks      Unavoidable waste 75 units    GUANAKITO Barfield

## 2025-05-16 ENCOUNTER — OFFICE VISIT (OUTPATIENT)
Dept: NEUROLOGY | Facility: CLINIC | Age: 55
End: 2025-05-16
Payer: COMMERCIAL

## 2025-05-16 DIAGNOSIS — G43.719 INTRACTABLE CHRONIC MIGRAINE WITHOUT AURA AND WITHOUT STATUS MIGRAINOSUS: ICD-10-CM

## 2025-05-16 RX ORDER — RIZATRIPTAN BENZOATE 10 MG/1
TABLET ORAL
Qty: 9 TABLET | Refills: 11 | Status: SHIPPED | OUTPATIENT
Start: 2025-05-16

## 2025-05-16 NOTE — PATIENT INSTRUCTIONS
Please call our clinic at [phone] or send a message on the [employer] portal if there are any changes to the plan described below, for example,if you are not contacted for the requested tests, referral(s) within one week, if you are unable to receive the medications prescribed, or if you feel you need to change the treatment course for any reason.     TESTING:  -- none    REFERRALS:  -- none    PREVENTION (use daily regardless of headache):  -- continue Botox  -- continue magnesium in ONE of the following preparations -               1. Magnesium oxide 800mg daily (the most common over the counter kind, may causes loose stools)              2. Magnesium citrate 400-500mg daily (harder to find, but more neutral on the bowels)              3. Magnesium glycinate 400mg daily (hardest to find, look online, but most bowel-neutral, best absorbed)     AS-NEEDED TREATMENT (use total no more than 10 days per month unless otherwise stated):  -- continue Ubrelvy with next migraine. You can repeat two hours later if needed. With this medication do not drink grapefruit juice or eat grapefruit or some medications like ketoconazole, itraconazole, or antibiotics clarithromycin   -- continue rizatriptan

## 2025-05-16 NOTE — PROGRESS NOTES
Date of service: 5/16/2025  Referring provider: No ref. provider found    Subjective:      Chief complaint: Headache       Patient ID: Sola Cross is a 55 y.o. female     History of Present Illness    INTERVAL HISTORY 5/16/25  The patient location is: [facility]   The chief complaint leading to consultation is: follow up  Visit type: audiovisual  20 minutes of total time spent on the encounter, which includes face to face time and non-face to face time preparing to see the patient (eg, review of tests), Obtaining and/or reviewing separately obtained history, Documenting clinical information in the electronic or other health record, Independently interpreting results (not separately reported) and communicating results to the patient/family/caregiver, or Care coordination (not separately reported).   Each patient to whom he or she provides medical services by telemedicine is:  (1) informed of the relationship between the physician and patient and the respective role of any other health care provider with respect to management of the patient; and (2) notified that he or she may decline to receive medical services by telemedicine and may withdraw from such care at any time.    Notes:     Last office visit was about three months ago and we restarted Botox six weeks ago.    Today she reports she is better. She is having fewer migraines. She reports 4 headache days per week with escalation to migraine twice per week. Current pain 3 with range 0-9. She takes rizatriptan and UBrelvy which are effective. Otherwise information below is reviewed and verified with no changes made    INTERVAL HISTORY 2/18/25    Last office visit was over two years ago and and last Botox was one year ago.    Today she reports she is worse. She is now having severe migraines that last for weeks at a time. Current pain 8 with range 4-10. She has a daily headache. She takes tylenol, ibuprofen, naproxen and rizatriptan daily. Otherwise  "information below is reviewed and verified with no changes made     ORIGINAL HEADACHE HISTORY - from 8/29/22 with Dr. Hoffman  Age at onset and course over time: first migraine after she had her first child in 1996 she was 26 years old. She had gone to neurologist in the past and was told it was stress induced migraine. lifelong headache attacks, She has had headache every day for many years she had been with OTC medications. Then things worsened after she had covid since June 2022 and since then the migraine is now much worse and now having more facial pain and all head pain. She mentions that HA is her first symptom of COVID and has continued. She mentions that she has holocephalic headache now. She mentions that she lives with severe back pain, but this is very disruptive now.   Location: fronrtal/periorbital, but can be holocephacli   Character: throbbing/pounding, tight band, pressure   Intensity:  2-10/10, currently 2/10   Baseline pain level is 3-4/10 and escalations to severe pain 5 or more days a week.   Frequency: 30/30  No headache free times   Duration: constant baseline, escalations >4 hours to days   Aura: none   Associated symptoms: photophobia, phonophobia, osmophobia, kinesiophobia, neck tightness/pain, nausea  Other neurologic symptoms: dizziness, blurry vision, mood changes, difficulty concentarting, neck tightness, nasal congestion, sinus pressure   Precipitating factors: sleep deprivation, weather changes, stress  Alleviating factors: sleep, darkness,  massage, local pressure, medications  Aggravating factors: exposure to light, sound, if has attack already coughing/sneezing/bending over  Family history of headache: mother and sister had migraine.   ER/UC visits: 0  Caffeine: "plenty"  Sleep: insomnia- trouble initiating/maintaining sleep , uses trazodone nightly.   Gyn: hysterectomy, menopausal     Current acute treatment:  Rizatriptan  Zofran    Current prevention:  Lexapro  Trazodone  Botox - " restarted March 2025    Previously tried/failed acute treatment:  Sumatriptan  Excedrin    Previously tried/failed preventative treatment:  Wellbutrin  Celexa  Sertraline   Botox - 9/22-1/24  Unable to do antihypertensive ssecondary to low blood pressuer at baseline  Unable to do Depakote due to weight and currently using ozempic for weight loss  Unable to do topiramate secondary to ADHD   Unable to use TCA due to trazodone use ofr sleep and weight.     Review of patient's allergies indicates:   Allergen Reactions    Demerol [meperidine] Itching    Niacin preparations Other (See Comments)     RED MAN SYNDRONE    Penicillins Swelling     OF THROAT    Sudafed [pseudoephedrine hcl] Palpitations    Wellbutrin [bupropion hcl] Photosensitivity, Nausea Only, Anxiety, Palpitations and Other (See Comments)     Leg numbness, sorrow, unexplained crying     Current Medications[1]    Past Medical History  Past Medical History:   Diagnosis Date    Adjustment disorder with anxiety 08/06/2015    Anemia 08/06/2015    Attention deficit disorder with hyperactivity(314.01) 08/06/2015    Bronchitis with flu     Complete tear of right rotator cuff 12/27/2016    Depression     General anesthetics causing adverse effect in therapeutic use     Headache     Hypotension, iatrogenic     TMJ (dislocation of temporomandibular joint)        Past Surgical History  Past Surgical History:   Procedure Laterality Date    ADENOIDECTOMY      BREAST BIOPSY      COLONOSCOPY      GASTRIC BYPASS      HYSTERECTOMY      ROTATOR CUFF REPAIR Right     SHOULDER ARTHROSCOPY Right 12/27/2016    RCR w/patch    TONSILLECTOMY         Family History  Family History   Problem Relation Name Age of Onset    Cancer Mother      Diabetes Father      Diabetes Sister      Arthritis Maternal Grandmother         Social History  Social History[2]     Objective:        There were no vitals filed for this visit.    There is no height or weight on file to calculate  BMI.    5/16/25  Constitutional:   She appears well-developed and well-nourished. She is well groomed     Neurological Exam:  General: well-developed, well-nourished, no distress  Mental status: Awake and alert  Speech language: No dysarthria or aphasia on conversation  Cranial nerves: Face symmetric      Data Review:     I have personally reviewed the referring provider's notes, labs, & imaging made available to me today.      RADIOLOGY STUDIES:  I have personally reviewed the pertinent images performed.       Results for orders placed or performed during the hospital encounter of 09/14/22   MRI Brain W WO Contrast    Narrative    EXAMINATION:  MRI BRAIN W WO CONTRAST    CLINICAL HISTORY:  severe worsening since COVID 6/22, worsening daily headache.;.  Headache, unspecified    TECHNIQUE:  Multiplanar multisequence MR imaging of the brain was performed before and after the administration of 6 mL Gadavist  intravenous contrast.    COMPARISON:  CT head 07/16/2021    FINDINGS:  Ventricles and sulci are normal in size for age without evidence of hydrocephalus.    Few scattered punctate T2/FLAIR hyperintense foci in the bilateral frontal and parietal subcortical white matter.  Findings are nonspecific, and could reflect sequela of chronic migraine headaches or early microvascular ischemic change, among other etiologies.   Diffusion-weighted images demonstrate no evidence of an acute infarct.   Susceptibility weighted images demonstrate no evidence of acute or chronic hemorrhage. No mass effect or midline shift.    No abnormal intracranial enhancement.    Normal vascular flow voids are preserved.    Bone marrow signal intensity is normal.  Mild scattered mucosal membrane thickening throughout the paranasal sinuses.  The visualized portions of the mastoids are unremarkable.  Orbits are unremarkable.      Impression    1. No evidence of an acute intracranial abnormality or abnormal enhancement.  2. Few scattered punctate  T2/FLAIR hyperintense foci within the bilateral supratentorial white matter, nonspecific and may reflect sequelae of chronic migraine headaches or microvascular ischemic change.  3. Mild paranasal sinus disease.      Electronically signed by: Nura Reynolds  Date:    09/14/2022  Time:    10:47   Results for orders placed or performed during the hospital encounter of 07/16/21   CT Head Without Contrast    Narrative    EXAMINATION:  CT HEAD WITHOUT CONTRAST    CLINICAL HISTORY:  Altered mental status;    TECHNIQUE:  Low dose axial images were obtained through the head.  Coronal and sagittal reformations were also performed. Contrast was not administered.  Automated exposure control was utilized.   [id number]    COMPARISON:  None.    FINDINGS:  No focal lesions are identified.  There is no evidence of ventricular dilatation, midline shift, or hemorrhage.  No territorial infarct was visualized.  No extra cerebral fluid collection was demonstrated.  The bony calvarium appears intact.  No sinus or mastoid abnormality was noted.      Impression    Negative study.      Electronically signed by: Riaz Brooks MD  Date:    07/16/2021  Time:    15:49       Lab Results   Component Value Date     (L) 06/19/2023    K 4.6 06/19/2023    MG 2.0 07/16/2021    CL 96 06/19/2023    CO2 31 06/19/2023    BUN 16 06/19/2023    CREATININE 0.54 06/19/2023    GLU 59 (L) 06/19/2023    HGBA1C 5.4 09/29/2020    AST 26 06/19/2023    ALT 18 06/19/2023    ALBUMIN 4.4 06/19/2023    PROT 6.7 06/19/2023    BILITOT 0.2 06/19/2023    CHOL 219 (H) 09/29/2020    HDL 83 (H) 09/29/2020    LDLCALC 115.2 09/29/2020    TRIG 104 09/29/2020       Lab Results   Component Value Date    WBC 6.79 06/19/2023    HGB 11.5 (L) 06/19/2023    HCT 33.3 (L) 06/19/2023    MCV 92 06/19/2023     06/19/2023       Lab Results   Component Value Date    TSH 1.550 06/19/2023           Assessment & Plan:       Problem List Items Addressed This Visit          Neuro     Intractable chronic migraine without aura and without status migrainosus    Overview   Migraine headaches since her 20's. Headaches are typically unilateral, moderate to severe in intensity, worsen with activity, pounding in quality and associated with sensitivity to light and sound.   Gradual progression pattern, lack of red flag features on history, and normal neurological exam are reassuring for primary as opposed to secondary etiology of headaches thus imaging will not be pursued for this history and this exam at this time.  The patient has chronic migraines ( G43.719) and suffers from headaches more than 3 months, more than 15 days of headache days per month lasting more than 4 hours with at least 8 attacks that meet criteria for migraine. She has tried multiple medications including but not limited to Lexapro, Trazodone, Wellbutrin, Celexa, Sertraline, Unable to do antihypertensive ssecondary to low blood pressuer at baseline  Unable to do Depakote due to weight and currently using ozempic for weight loss  Unable to do topiramate secondary to ADHD   Unable to use TCA due to trazodone use ofr sleep and weight.   The patient has been unresponsive and refractory.The patient meets criteria for chronic headaches according to the ICHD-II, the patient has more than 15 headaches a month which last for more than 4 hours a day. The patient is an ideal candidate for Botox. After treatment, I expect 50%  improvement in the patient's symptoms. A reduction of at least 7 days per month and the number of cumulative hours suffering with headaches as well as at least 100 total hours affected with migraine per month.  DESCRIPTION OF PROCEDURE: After obtaining informed consent and under aseptic technique, a total of 155 units of botulinum toxin type A to be injected in the following muscles:      -- Procerus 5 units  --  5 units bilaterally  -- Frontalis 20 units  -- Temporalis 20 units bilaterally  -- Occipitalis 15  units bilaterally  -- Upper cervical paraspinals 10 units bilaterally  -- Trapezius 15 units bilaterally.       Unavoidable waste 45 units    Add gepant for acute attacks.           Current Assessment & Plan   She is s/p first Botox and having fewer and less severe migraine attacks. Continue current plan.          Relevant Medications    rizatriptan (MAXALT) 10 MG tablet             Please call our clinic at [phone] or send a message on the [employer] portal if there are any changes to the plan described below, for example,if you are not contacted for the requested tests, referral(s) within one week, if you are unable to receive the medications prescribed, or if you feel you need to change the treatment course for any reason.     TESTING:  -- none    REFERRALS:  -- none    PREVENTION (use daily regardless of headache):  -- continue Botox  -- continue magnesium in ONE of the following preparations -               1. Magnesium oxide 800mg daily (the most common over the counter kind, may causes loose stools)              2. Magnesium citrate 400-500mg daily (harder to find, but more neutral on the bowels)              3. Magnesium glycinate 400mg daily (hardest to find, look online, but most bowel-neutral, best absorbed)     AS-NEEDED TREATMENT (use total no more than 10 days per month unless otherwise stated):  -- continue Ubrelvy with next migraine. You can repeat two hours later if needed. With this medication do not drink grapefruit juice or eat grapefruit or some medications like ketoconazole, itraconazole, or antibiotics clarithromycin   -- continue rizatriptan    Follow up in 1 month (on 6/19/2025).      Lyric Page NP                   [1]   Current Outpatient Medications   Medication Sig Dispense Refill    CHOLECALCIFEROL, VITAMIN D3, (VITAMIN D3 ORAL) Take 1 tablet by mouth once daily.       CYANOCOBALAMIN, VITAMIN B-12, ORAL Take 1 tablet by mouth once daily.       dextroamphetamine-amphetamine  (ADDERALL XR) 30 MG 24 hr capsule Take 1 capsule (30 mg total) by mouth every morning. 30 capsule 0    dextroamphetamine-amphetamine (ADDERALL XR) 30 MG 24 hr capsule Take 1 capsule (30 mg total) by mouth every morning. 30 capsule 0    dextroamphetamine-amphetamine (ADDERALL XR) 30 MG 24 hr capsule Take 1 capsule (30 mg total) by mouth every morning. 30 capsule 0    dextroamphetamine-amphetamine (ADDERALL) 30 mg Tab One table Daily after lunch. 30 tablet 0    dextroamphetamine-amphetamine (ADDERALL) 30 mg Tab One tablet Daily after lunch. 30 tablet 0    dextroamphetamine-amphetamine (ADDERALL) 30 mg Tab Take 1 tablet (30 mg total) by mouth after lunch. 30 tablet 0    dextroamphetamine-amphetamine (ADDERALL) 30 mg Tab Take one tablet by mouth daily after lunch 30 tablet 0    diclofenac sodium (VOLTAREN) 1 % Gel Apply 2 g topically 4 (four) times daily. 150 g 2    EScitalopram oxalate (LEXAPRO) 20 MG tablet Take 1 tablet (20 mg total) by mouth once daily. 90 tablet 1    ferrous sulfate 325 mg (65 mg iron) Tab tablet Take 325 mg by mouth daily with breakfast.      fluticasone propionate (FLONASE) 50 mcg/actuation nasal spray 1 spray (50 mcg total) by Each Nostril route once daily. 16 g 2    HYDROcodone-acetaminophen (NORCO) 5-325 mg per tablet Take 1 tablet by mouth every 6 (six) hours as needed for Pain. (Patient not taking: Reported on 5/15/2025) 10 tablet 0    hydrOXYzine HCL (ATARAX) 25 MG tablet TAKE 1 TABLET(25 MG) BY MOUTH EVERY NIGHT AS NEEDED FOR INSOMNIA (Patient not taking: Reported on 5/15/2025) 90 tablet 1    multivitamin (THERAGRAN) per tablet Take 1 tablet by mouth once daily.      naproxen sodium (ANAPROX) 550 MG tablet Take 1 tablet (550 mg total) by mouth 2 (two) times daily with meals. 20 tablet 0    ondansetron (ZOFRAN-ODT) 4 MG TbDL Take 1 tablet (4 mg total) by mouth every 6 (six) hours as needed (nausea). 30 tablet 1    rizatriptan (MAXALT) 10 MG tablet 1 tab PO PRN migraine. May repeat every 2  hours for max 3 tabs in 24 hours. Use no more than 10 days per month. 9 tablet 11    spironolactone (ALDACTONE) 50 MG tablet Take 50 mg by mouth 2 (two) times daily.      tiZANidine (ZANAFLEX) 4 MG tablet Take 1 tablet (4 mg total) by mouth nightly as needed (pain). 30 tablet 1    traMADoL (ULTRAM) 50 mg tablet Take 50 mg by mouth every 6 (six) hours.      traZODone (DESYREL) 100 MG tablet Take 2 tablets (200 mg total) by mouth every evening. TAKE 2 TABLETS BY MOUTH NIGHTLY AS NEEDED FOR INSOMNIA 180 tablet 1    ubrogepant (UBRELVY) 100 mg tablet Take 1 tablet by mouth as needed for migraine. May repeat in 2 hours if needed. Max 2 tablets per day 16 tablet 11    UNABLE TO FIND medication name: Hormone Replacement       Current Facility-Administered Medications   Medication Dose Route Frequency Provider Last Rate Last Admin    onabotulinumtoxina injection 200 Units  200 Units Intramuscular q12 weeks Lyric Page, NP   200 Units at 12/13/22 0959    onabotulinumtoxina injection 200 Units  200 Units Intramuscular q12 weeks Lyric Page, NP   200 Units at 02/28/23 1003    onabotulinumtoxina injection 200 Units  200 Units Intramuscular q12 weeks Lyric Page, NP   200 Units at 03/27/25 1511   [2]   Social History  Socioeconomic History    Marital status: Single   Tobacco Use    Smoking status: Never    Smokeless tobacco: Never   Substance and Sexual Activity    Alcohol use: Yes     Alcohol/week: 0.0 standard drinks of alcohol     Comment: ONCE EVERY 2 MONTHS    Drug use: No    Sexual activity: Yes     Social Drivers of Health     Stress: No Stress Concern Present (9/18/2019)    [employer] - Occupational Stress Questionnaire     Feeling of Stress : Only a little

## 2025-06-13 ENCOUNTER — TELEPHONE (OUTPATIENT)
Dept: NEUROLOGY | Facility: CLINIC | Age: 55
End: 2025-06-13
Payer: OTHER MISCELLANEOUS

## 2025-06-23 ENCOUNTER — PROCEDURE VISIT (OUTPATIENT)
Dept: NEUROLOGY | Facility: CLINIC | Age: 55
End: 2025-06-23
Payer: COMMERCIAL

## 2025-06-23 VITALS — SYSTOLIC BLOOD PRESSURE: 119 MMHG | HEART RATE: 92 BPM | RESPIRATION RATE: 18 BRPM | DIASTOLIC BLOOD PRESSURE: 75 MMHG

## 2025-06-23 DIAGNOSIS — G43.719 INTRACTABLE CHRONIC MIGRAINE WITHOUT AURA AND WITHOUT STATUS MIGRAINOSUS: Primary | ICD-10-CM

## 2025-06-23 DIAGNOSIS — R11.0 NAUSEA: ICD-10-CM

## 2025-06-23 RX ORDER — RIZATRIPTAN BENZOATE 10 MG/1
TABLET ORAL
Qty: 6 TABLET | Refills: 11 | Status: SHIPPED | OUTPATIENT
Start: 2025-06-23

## 2025-06-23 RX ORDER — ONDANSETRON 4 MG/1
4 TABLET, ORALLY DISINTEGRATING ORAL EVERY 6 HOURS PRN
Qty: 30 TABLET | Refills: 11 | Status: SHIPPED | OUTPATIENT
Start: 2025-06-23

## 2025-06-23 RX ADMIN — ONABOTULINUMTOXINA 200 UNITS: 100 INJECTION, POWDER, LYOPHILIZED, FOR SOLUTION INTRADERMAL; INTRAMUSCULAR at 02:06

## 2025-06-23 NOTE — PROCEDURES
Procedures    A time out was conducted just before the start of the procedure to verify the correct patient and procedure, procedure location, and all relevant critical information.      Conventional methods of treatment such as multiple medications, both on and   off label have been tried including: Lexapro, trazodone, sertraline, Unable to do antihypertensive ssecondary to low blood pressuer at baseline. Unable to do Depakote due to weight and currently using ozempic for weight loss  Unable to do topiramate secondary to ADHD   Unable to use TCA due to trazodone use ofr sleep and weight.      The patient has been unresponsive and refractory.The patient meets criteria for chronic headaches according to the ICHD-II, the patient has more than 15 headaches a month which last for more than 4 hours a day.     Botox session number: 2  Last session was 12 weeks ago and resulted in improvement of: n/a     I am aiming for at least 50%  improvement in the patient's symptoms. Frequency of treatment is every 3 months unless no response to the treatments, at which time we will discontinue the injections.      DESCRIPTION OF PROCEDURE: After obtaining informed consent and under   aseptic technique, a total of 125 units of botulinum toxin type A were   injected in the following muscles:      -- Procerus 5 units  --  5 units bilaterally  -- Frontalis 20 units  -- Temporalis 20 units bilaterally  -- Occipitalis 15 units bilaterally  -- Upper cervical paraspinals 10 units bilaterally  -- SPARED Trapezius 15 units bilaterally. (Due to weakness)     The patient tolerated the procedure well. There were no complications. The patient was given a prescription for repeat treatment in 12 weeks      Unavoidable waste 75 units    GUANAKITO Barfield